# Patient Record
Sex: FEMALE | Race: BLACK OR AFRICAN AMERICAN | Employment: OTHER | URBAN - METROPOLITAN AREA
[De-identification: names, ages, dates, MRNs, and addresses within clinical notes are randomized per-mention and may not be internally consistent; named-entity substitution may affect disease eponyms.]

---

## 2017-01-04 ENCOUNTER — OFFICE VISIT (OUTPATIENT)
Dept: NEUROLOGY | Age: 82
End: 2017-01-04

## 2017-01-04 VITALS
HEIGHT: 65 IN | WEIGHT: 162.4 LBS | OXYGEN SATURATION: 98 % | HEART RATE: 120 BPM | RESPIRATION RATE: 20 BRPM | SYSTOLIC BLOOD PRESSURE: 132 MMHG | DIASTOLIC BLOOD PRESSURE: 68 MMHG | BODY MASS INDEX: 27.06 KG/M2

## 2017-01-04 DIAGNOSIS — G20 PARKINSON'S DISEASE (HCC): Primary | ICD-10-CM

## 2017-01-04 DIAGNOSIS — G40.209 PARTIAL EPILEPSY WITH IMPAIRMENT OF CONSCIOUSNESS (HCC): ICD-10-CM

## 2017-01-04 RX ORDER — CARBIDOPA AND LEVODOPA 25; 100 MG/1; MG/1
1.5 TABLET ORAL 3 TIMES DAILY
Qty: 145 TAB | Refills: 6 | Status: SHIPPED | OUTPATIENT
Start: 2017-01-04 | End: 2017-04-05 | Stop reason: SDUPTHER

## 2017-01-04 NOTE — PROGRESS NOTES
Date:  2017    Name:  Maritza Fierro  :  1935  MRN:  [id number]     PCP:  Chandrika Stephens MD    Chief Complaint   Patient presents with    Parkinsons Disease     follow up    Epilepsy     HISTORY OF PRESENT ILLNESS: Follow up for PD and epilepsy. Continues to be stable with regard to epilepsy on present therapy of Dilantin ER. No seizures since . With regard to the PD: Tremors/Shaking-only with certain positions. Occurs at rest. Questions diagnosis as balance issues are attributed to her arthritis and tremors are not that noticeable. Muscle stiffness (rigidity), Problems with balance, shuffling walk, falls but attributes this with her severe knee arthritis. Slowness (bradykinesia)-states that she is very slow and takes her a longer time to get going. Notes that the arthritis in her knees and back makes everything worse. No freezing or wearing off, off time, Soft voice, issues swallowing, Sudden, erratic movements (Dyskinesias),Memory loss, difficulty thinking or remembering clearly, Depression/anxiety  Difficulty sleeping but this relates to discomfort in her legs and feet which is related to the numbness and tingling, no issues with talking in sleep or acting out dreams, Hallucinations/delusions, lightheadedness, positional dizziness. No issues with cooking, driving a car, eating, Getting dressed, bathing, housework/yardwork, rising from the bed or chair, shopping, handwriting, recreational activities     Current Outpatient Prescriptions   Medication Sig    ERGOCALCIFEROL, VITAMIN D2, (VITAMIN D2 PO) Take  by mouth.  carbidopa-levodopa (SINEMET)  mg per tablet Take 1 Tab by mouth three (3) times daily. 1/2 tab po tid for 1 week, then 1 pot tid thereafter. ..    DILANTIN 100 mg ER capsule TAKE 4 CAPSULES BY MOUTH ON EVEN DAYS AND 3 CAPSULES BY MOUTH ON ODD DAYS    traMADol-acetaminophen (ULTRACET) 37.5-325 mg per tablet Take one by mouth as needed for pain    ferrous sulfate, dried (IRON) 160 mg (50 mg iron) TbER Take  by mouth.  irbesartan (AVAPRO) 300 mg tablet Take 300 mg by mouth nightly.  nystatin-triamcinolone (MYCOLOG) 100,000-0.1 unit/gram-% ointment Apply  to affected area two (2) times a day.  multivitamin with iron tablet Take 1 Tab by mouth daily.  metFORMIN (GLUCOPHAGE) 500 mg tablet Take  by mouth two (2) times daily (with meals).  simvastatin (ZOCOR) 10 mg tablet Take  by mouth nightly. No current facility-administered medications for this visit. No Known Allergies  Past Medical History   Diagnosis Date    Anemia     Arthritis     DDD (degenerative disc disease)     Diabetes [facility]     Diabetes mellitus with neuropathy [facility]     Endogenous hyperlipemia     Hypertension     Parkinson disease ([facility]     Seizures ([facility]      Past Surgical History   Procedure Laterality Date    Hx oophorectomy       Social History     Social History    Marital status:      Spouse name: N/A    Number of children: N/A    Years of education: N/A     Occupational History    Not on file. Social History Main Topics    Smoking status: Former Smoker     Quit date: 10/23/1995    Smokeless tobacco: Current User     Types: Chew    Alcohol use Yes    Drug use: No    Sexual activity: Not on file     Other Topics Concern    Not on file     Social History Narrative     History reviewed. No pertinent family history. PHYSICAL EXAMINATION:    Visit Vitals    /68    Pulse (!) 120    Resp 20    Ht 5' 5\" (1.651 m)    Wt 73.7 kg (162 lb 6.4 oz)    SpO2 98%    BMI 27.02 kg/m2     General: Well defined, nourished, and groomed individual in no acute distress. Neck: Supple, nontender, no bruits, no pain with resistance to active range of motion. Heart: Regular rate and rhythm, no murmurs, rub, or gallop. Normal S1S2.   Lungs: Clear to auscultation bilaterally with equal chest expansion, no cough, no wheeze  Musculoskeletal: Extremities revealed no edema and had full range of motion of joints. Psych: Good mood and bright affect     NEUROLOGICAL EXAMINATION:   Mental Status: Alert and oriented to person, place, and time with recent and remote memory intact. Attention span and concentration are normal. Speech is fluent with a full fund of knowledge.      Cranial Nerves:   II, III, IV, VI: Visual acuity grossly intact. Visual fields are normal.   Pupils are equal, round, and reactive to light and accommodation. Extra-ocular movements are full and fluid. Fundoscopic exam was benign, no ptosis or nystagmus. V-XII: Hearing is grossly intact. Facial features are symmetric, with normal sensation and strength. The palate rises symmetrically and the tongue protrudes midline. Sternocleidomastoids 5/5.      Motor Examination: Normal tone, bulk, and strength, 5/5 muscle strength throughout. Mild bilateral cogwheel rigidity, left greater than right.      Coordination: Finger to nose and rapid arm movement testing revealed bradykinesia. Left resting or intention tremor     Gait and Station: Scissor gait, shuffled, decreased bilateral arm swing, L>R, left attenuation of the tremor, 7 step en bloc turn. No pronator drift. No muscle wasting or fasiculations noted.      Reflexes: DTRs 2+ throughout. ASSESSMENT AND PLAN    ICD-10-CM ICD-9-CM    1. Parkinson's disease ([address] G20 332.0 carbidopa-levodopa (SINEMET)  mg per tablet   2. Partial epilepsy with impairment of consciousness ([address] G40.209 345.40      Discussed her exam and features of which would make her diagnosis of Parkinson disease most likely. We could send her for a SABAS scan which may help to confirm the diagnosis as well as second opinion. However, she seemed satisfied with the explanation that was provided. We discussed again the signs and symptoms of PD, diagnosis, prognosis, progression and treatment.  She may be a bit more symptomatic on today's exam than she was at her last and we discussed her present treatment. Will increase the Sinemet to 1.5 tablets three times a day. Epilepsy is stable and will continue present dose of Dilantin. Follow up in three months. Rosa Guthrie

## 2017-01-04 NOTE — PATIENT INSTRUCTIONS
[facility]   Statement to Patients  April 1, 2014      In an effort to ensure the large volume of patient prescription refills is processed in the most efficient and expeditious manner, we are asking our patients to assist us by calling your Pharmacy for all prescription refills, this will include also your  Mail Order Pharmacy. The pharmacy will contact our office electronically to continue the refill process. Please do not wait until the last minute to call your pharmacy. We need at least 48 hours (2days) to fill prescriptions. We also encourage you to call your pharmacy before going to  your prescription to make sure it is ready. With regard to controlled substance prescription refill requests (narcotic refills) that need to be picked up at our office, we ask your cooperation by providing us with at least 72 hours (3days) notice that you will need a refill. We will not refill narcotic prescription refill requests after 4:00pm on any weekday, Monday through Thursday, or after 2:00pm on Fridays, or on the weekends. We encourage everyone to explore another way of getting your prescription refill request processed using [employer], our patient web portal through our electronic medical record system. [employer] is an efficient and effective way to communicate your medication request directly to the office and  downloadable as an geovany on your smart phone . [employer] also features a review functionality that allows you to view your medication list as well as leave messages for your physician. Are you ready to get connected? If so please review the attatched instructions or speak to any of our staff to get you set up right away! Thank you so much for your cooperation. Should you have any questions please contact our Practice Administrator.     The Physicians and Staff,  [facility]       Thank you for choosing [facility] for your     care. You may receive a survey about your visit. We appreciate you taking time     to complete this survey as we use your feedback to improve our services. We     realize we are not perfect, but we strive to provide excellent care. A Healthy Lifestyle: Care Instructions  Your Care Instructions  A healthy lifestyle can help you feel good, stay at a healthy weight, and have plenty of energy for both work and play. A healthy lifestyle is something you can share with your whole family. A healthy lifestyle also can lower your risk for serious health problems, such as high blood pressure, heart disease, and diabetes. You can follow a few steps listed below to improve your health and the health of your family. Follow-up care is a key part of your treatment and safety. Be sure to make and go to all appointments, and call your doctor if you are having problems. Its also a good idea to know your test results and keep a list of the medicines you take. How can you care for yourself at home? · Do not eat too much sugar, fat, or fast foods. You can still have dessert and treats now and then. The goal is moderation. · Start small to improve your eating habits. Pay attention to portion sizes, drink less juice and soda pop, and eat more fruits and vegetables. ¨ Eat a healthy amount of food. A 3-ounce serving of meat, for example, is about the size of a deck of cards. Fill the rest of your plate with vegetables and whole grains. ¨ Limit the amount of soda and sports drinks you have every day. Drink more water when you are thirsty. ¨ Eat at least 5 servings of fruits and vegetables every day. It may seem like a lot, but it is not hard to reach this goal. A serving or helping is 1 piece of fruit, 1 cup of vegetables, or 2 cups of leafy, raw vegetables. Have an apple or some carrot sticks as an afternoon snack instead of a candy bar.  Try to have fruits and/or vegetables at every meal.  · Make exercise part of your daily routine. You may want to start with simple activities, such as walking, bicycling, or slow swimming. Try to be active 30 to 60 minutes every day. You do not need to do all 30 to 60 minutes all at once. For example, you can exercise 3 times a day for 10 or 20 minutes. Moderate exercise is safe for most people, but it is always a good idea to talk to your doctor before starting an exercise program.  · Keep moving. Fidel Travon the lawn, work in the garden, or [employer]. Take the stairs instead of the elevator at work. · If you smoke, quit. People who smoke have an increased risk for heart attack, stroke, cancer, and other lung illnesses. Quitting is hard, but there are ways to boost your chance of quitting tobacco for good. ¨ Use nicotine gum, patches, or lozenges. ¨ Ask your doctor about stop-smoking programs and medicines. ¨ Keep trying. In addition to reducing your risk of diseases in the future, you will notice some benefits soon after you stop using tobacco. If you have shortness of breath or asthma symptoms, they will likely get better within a few weeks after you quit. · Limit how much alcohol you drink. Moderate amounts of alcohol (up to 2 drinks a day for men, 1 drink a day for women) are okay. But drinking too much can lead to liver problems, high blood pressure, and other health problems. Family health  If you have a family, there are many things you can do together to improve your health. · Eat meals together as a family as often as possible. · Eat healthy foods. This includes fruits, vegetables, lean meats and dairy, and whole grains. · Include your family in your fitness plan. Most people think of activities such as jogging or tennis as the way to fitness, but there are many ways you and your family can be more active. Anything that makes you breathe hard and gets your heart pumping is exercise.  Here are some tips:  ¨ Walk to do errands or to take your child to school or the bus. ¨ Go for a family bike ride after dinner instead of watching TV. Where can you learn more? Go to http://elaine-sandra.info/. Enter L621 in the search box to learn more about \"A Healthy Lifestyle: Care Instructions. \"  Current as of: July 26, 2016  Content Version: 11.1  © 6776-3593 [employer]. Care instructions adapted under license by [employer] (which disclaims liability or warranty for this information). If you have questions about a medical condition or this instruction, always ask your healthcare professional. [place] [address] any warranty or liability for your use of this information.

## 2017-01-04 NOTE — MR AVS SNAPSHOT
Visit Information Date & Time Provider Department Dept. Phone Encounter #  
 1/4/2017  1:00 PM Rebeka Castro NP Neurology [facility] [phone] [id number] Follow-up Instructions Return in about 3 months (around 4/4/2017). Upcoming Health Maintenance Date Due  
 FOOT EXAM Q1 11/8/1945 MICROALBUMIN Q1 11/8/1945 EYE EXAM RETINAL OR DILATED Q1 11/8/1946 DTaP/Tdap/Td series (1 - Tdap) 11/8/1957 ZOSTER VACCINE AGE 60> 11/8/1996 GLAUCOMA SCREENING Q2Y 11/8/2001 OSTEOPOROSIS SCREENING (DEXA) 11/8/2001 Pneumococcal 65+ Low/Medium Risk (1 of 2 - PCV13) 11/8/2001 MEDICARE YEARLY EXAM 11/8/2001 INFLUENZA AGE 9 TO ADULT 8/1/2016 HEMOGLOBIN A1C Q6M 1/11/2017 LIPID PANEL Q1 4/7/2017 Allergies as of 1/4/2017  Review Complete On: 1/4/2017 By: Rebeka Castro NP No Known Allergies Current Immunizations  Never Reviewed No immunizations on file. Not reviewed this visit You Were Diagnosed With   
  
 Codes Comments Parkinson's disease ([facility]    -  Primary ICD-10-CM: G20 
ICD-9-CM: 332.0 Partial epilepsy with impairment of consciousness ([facility]     ICD-10-CM: X91.226 ICD-9-CM: 345.40 Vitals BP Pulse Resp Height(growth percentile) Weight(growth percentile) SpO2  
 132/68 (!) 120 20 5' 5\" (1.651 m) 162 lb 6.4 oz (73.7 kg) 98% BMI OB Status Smoking Status 27.02 kg/m2 Hysterectomy Former Smoker BMI and BSA Data Body Mass Index Body Surface Area  
 27.02 kg/m 2 1.84 m 2 Preferred Pharmacy Pharmacy Name Phone [facility] [phone], [address] [facility] [phone] Your Updated Medication List  
  
   
This list is accurate as of: 1/4/17  1:40 PM.  Always use your most recent med list.  
  
  
  
  
 AVAPRO 300 mg tablet Generic drug:  irbesartan Take 300 mg by mouth nightly. carbidopa-levodopa  mg per tablet Commonly known as:  SINEMET Take 1.5 Tabs by mouth three (3) times daily. Dilantin 100 mg ER capsule Generic drug:  phenytoin ER  
TAKE 4 CAPSULES BY MOUTH ON EVEN DAYS AND 3 CAPSULES BY MOUTH ON ODD DAYS Iron 160 mg (50 mg iron) Tber tablet Generic drug:  ferrous sulfate ER Take  by mouth.  
  
 metFORMIN 500 mg tablet Commonly known as:  GLUCOPHAGE Take  by mouth two (2) times daily (with meals). multivitamin with iron tablet Take 1 Tab by mouth daily. nystatin-triamcinolone 100,000-0.1 unit/gram-% ointment Commonly known as:  [place] Apply  to affected area two (2) times a day. traMADol-acetaminophen 37.5-325 mg per tablet Commonly known as:  ULTRACET Take one by mouth as needed for pain VITAMIN D2 PO Take  by mouth. ZOCOR 10 mg tablet Generic drug:  simvastatin Take  by mouth nightly. Prescriptions Sent to Pharmacy Refills  
 carbidopa-levodopa (SINEMET)  mg per tablet 6 Sig: Take 1.5 Tabs by mouth three (3) times daily. Class: Normal  
 Pharmacy: [email or web address] [address] #: [phone] Route: Oral  
  
Follow-up Instructions Return in about 3 months (around 4/4/2017). Patient Instructions PRESCRIPTION REFILL POLICY [facility] Statement to Patients April 1, 2014 In an effort to ensure the large volume of patient prescription refills is processed in the most efficient and expeditious manner, we are asking our patients to assist us by calling your Pharmacy for all prescription refills, this will include also your  Mail Order Pharmacy. The pharmacy will contact our office electronically to continue the refill process. Please do not wait until the last minute to call your pharmacy. We need at least 48 hours (2days) to fill prescriptions.  We also encourage you to call your pharmacy before going to  your prescription to make sure it is ready. With regard to controlled substance prescription refill requests (narcotic refills) that need to be picked up at our office, we ask your cooperation by providing us with at least 72 hours (3days) notice that you will need a refill. We will not refill narcotic prescription refill requests after 4:00pm on any weekday, Monday through Thursday, or after 2:00pm on Fridays, or on the weekends. We encourage everyone to explore another way of getting your prescription refill request processed using [employer], our patient web portal through our electronic medical record system. [employer] is an efficient and effective way to communicate your medication request directly to the office and  downloadable as an geovany on your smart phone . [employer] also features a review functionality that allows you to view your medication list as well as leave messages for your physician. Are you ready to get connected? If so please review the attatched instructions or speak to any of our staff to get you set up right away! Thank you so much for your cooperation. Should you have any questions please contact our Practice Administrator. The Physicians and Staff,  [facility] Thank you for choosing [facility] and [facility] for your  
 
care. You may receive a survey about your visit. We appreciate you taking time  
 
to complete this survey as we use your feedback to improve our services. We  
 
realize we are not perfect, but we strive to provide excellent care. A Healthy Lifestyle: Care Instructions Your Care Instructions A healthy lifestyle can help you feel good, stay at a healthy weight, and have plenty of energy for both work and play. A healthy lifestyle is something you can share with your whole family.  
A healthy lifestyle also can lower your risk for serious health problems, such as high blood pressure, heart disease, and diabetes. You can follow a few steps listed below to improve your health and the health of your family. Follow-up care is a key part of your treatment and safety. Be sure to make and go to all appointments, and call your doctor if you are having problems. Its also a good idea to know your test results and keep a list of the medicines you take. How can you care for yourself at home? · Do not eat too much sugar, fat, or fast foods. You can still have dessert and treats now and then. The goal is moderation. · Start small to improve your eating habits. Pay attention to portion sizes, drink less juice and soda pop, and eat more fruits and vegetables. ¨ Eat a healthy amount of food. A 3-ounce serving of meat, for example, is about the size of a deck of cards. Fill the rest of your plate with vegetables and whole grains. ¨ Limit the amount of soda and sports drinks you have every day. Drink more water when you are thirsty. ¨ Eat at least 5 servings of fruits and vegetables every day. It may seem like a lot, but it is not hard to reach this goal. A serving or helping is 1 piece of fruit, 1 cup of vegetables, or 2 cups of leafy, raw vegetables. Have an apple or some carrot sticks as an afternoon snack instead of a candy bar. Try to have fruits and/or vegetables at every meal. 
· Make exercise part of your daily routine. You may want to start with simple activities, such as walking, bicycling, or slow swimming. Try to be active 30 to 60 minutes every day. You do not need to do all 30 to 60 minutes all at once. For example, you can exercise 3 times a day for 10 or 20 minutes. Moderate exercise is safe for most people, but it is always a good idea to talk to your doctor before starting an exercise program. 
· Keep moving. Ha[place] Cluck the lawn, work in the garden, or [employer] [place]. Take the stairs instead of the elevator at work. · If you smoke, quit. People who smoke have an increased risk for heart attack, stroke, cancer, and other lung illnesses. Quitting is hard, but there are ways to boost your chance of quitting tobacco for good. ¨ Use nicotine gum, patches, or lozenges. ¨ Ask your doctor about stop-smoking programs and medicines. ¨ Keep trying. In addition to reducing your risk of diseases in the future, you will notice some benefits soon after you stop using tobacco. If you have shortness of breath or asthma symptoms, they will likely get better within a few weeks after you quit. · Limit how much alcohol you drink. Moderate amounts of alcohol (up to 2 drinks a day for men, 1 drink a day for women) are okay. But drinking too much can lead to liver problems, high blood pressure, and other health problems. Family health If you have a family, there are many things you can do together to improve your health. · Eat meals together as a family as often as possible. · Eat healthy foods. This includes fruits, vegetables, lean meats and dairy, and whole grains. · Include your family in your fitness plan. Most people think of activities such as jogging or tennis as the way to fitness, but there are many ways you and your family can be more active. Anything that makes you breathe hard and gets your heart pumping is exercise. Here are some tips: 
¨ Walk to do errands or to take your child to school or the bus. ¨ Go for a family bike ride after dinner instead of watching TV. Where can you learn more? Go to http://elaine-sandra.info/. Enter W241 in the search box to learn more about \"A Healthy Lifestyle: Care Instructions. \" Current as of: July 26, 2016 Content Version: 11.1 © 0791-0486 [employer]. Care instructions adapted under license by [employer] (which disclaims liability or warranty for this information).  If you have questions about a medical condition or this instruction, always ask your healthcare professional. [facility] any warranty or liability for your use of this information. Introducing [facility] SERVICES! Vicki Segura introduces "[employer] patient portal. Now you can access parts of your medical record, email your doctor's office, and request medication refills online. 1. In your internet browser, go to [email or web address] 2. Click on the First Time User? Click Here link in the Sign In box. You will see the New Member Sign Up page. 3. Enter your "[employer] Access Code exactly as it appears below. You will not need to use this code after youve completed the sign-up process. If you do not sign up before the expiration date, you must request a new code. · "[employer] Access Code: [email or web address] Expires: 4/4/2017  1:40 PM 
 
4. Enter the last four digits of your Social Security Number (xxxx) and Date of Birth (mm/dd/yyyy) as indicated and click Submit. You will be taken to the next sign-up page. 5. Create a "[employer] ID. This will be your "[employer] login ID and cannot be changed, so think of one that is secure and easy to remember. 6. Create a "[employer] password. You can change your password at any time. 7. Enter your Password Reset Question and Answer. This can be used at a later time if you forget your password. 8. Enter your e-mail address. You will receive e-mail notification when new information is available in [address] 9. Click Sign Up. You can now view and download portions of your medical record. 10. Click the Download Summary menu link to download a portable copy of your medical information. If you have questions, please visit the Frequently Asked Questions section of the "[employer] website. Remember, "[employer] is NOT to be used for urgent needs. For medical emergencies, dial 911. Now available from your iPhone and Android! Please provide this summary of care documentation to your next provider. Your primary care clinician is listed as Ronni Miller. If you have any questions after today's visit, please call [phone].

## 2017-01-04 NOTE — PROGRESS NOTES
Patient present for a follow up. Reports no recurrent seizure activity since 1995. Tremors only occur when she's nervous or when her hands are positioned a certain way on her legs.

## 2017-01-05 ENCOUNTER — TELEPHONE (OUTPATIENT)
Dept: NEUROLOGY | Age: 82
End: 2017-01-05

## 2017-04-05 ENCOUNTER — OFFICE VISIT (OUTPATIENT)
Dept: NEUROLOGY | Age: 82
End: 2017-04-05

## 2017-04-05 VITALS
HEIGHT: 65 IN | SYSTOLIC BLOOD PRESSURE: 132 MMHG | BODY MASS INDEX: 26.99 KG/M2 | DIASTOLIC BLOOD PRESSURE: 62 MMHG | RESPIRATION RATE: 20 BRPM | WEIGHT: 162 LBS

## 2017-04-05 DIAGNOSIS — M54.31 SCIATICA OF RIGHT SIDE: ICD-10-CM

## 2017-04-05 DIAGNOSIS — M17.0 PRIMARY OSTEOARTHRITIS OF BOTH KNEES: ICD-10-CM

## 2017-04-05 DIAGNOSIS — G40.209 PARTIAL EPILEPSY WITH IMPAIRMENT OF CONSCIOUSNESS (HCC): ICD-10-CM

## 2017-04-05 DIAGNOSIS — G60.9 IDIOPATHIC PERIPHERAL NEUROPATHY: ICD-10-CM

## 2017-04-05 DIAGNOSIS — G20 PARKINSON'S DISEASE (HCC): Primary | ICD-10-CM

## 2017-04-05 RX ORDER — LIDOCAINE 50 MG/G
PATCH TOPICAL
COMMUNITY
Start: 2017-04-04

## 2017-04-05 RX ORDER — GABAPENTIN 100 MG/1
100 CAPSULE ORAL 3 TIMES DAILY
Qty: 90 CAP | Refills: 2 | Status: SHIPPED | OUTPATIENT
Start: 2017-04-05 | End: 2017-07-27 | Stop reason: SDUPTHER

## 2017-04-05 RX ORDER — PHENYTOIN SODIUM EXTENDED 100 MG
CAPSULE ORAL
Qty: 360 CAP | Refills: 1 | Status: SHIPPED | OUTPATIENT
Start: 2017-04-05 | End: 2018-05-15 | Stop reason: SDUPTHER

## 2017-04-05 RX ORDER — CARBIDOPA AND LEVODOPA 25; 100 MG/1; MG/1
1.5 TABLET ORAL 3 TIMES DAILY
Qty: 145 TAB | Refills: 6 | Status: SHIPPED | OUTPATIENT
Start: 2017-04-05 | End: 2018-03-12 | Stop reason: SDUPTHER

## 2017-04-05 NOTE — PATIENT INSTRUCTIONS
[facility]   Statement to Patients  April 1, 2014      In an effort to ensure the large volume of patient prescription refills is processed in the most efficient and expeditious manner, we are asking our patients to assist us by calling your Pharmacy for all prescription refills, this will include also your  Mail Order Pharmacy. The pharmacy will contact our office electronically to continue the refill process. Please do not wait until the last minute to call your pharmacy. We need at least 48 hours (2days) to fill prescriptions. We also encourage you to call your pharmacy before going to  your prescription to make sure it is ready. With regard to controlled substance prescription refill requests (narcotic refills) that need to be picked up at our office, we ask your cooperation by providing us with at least 72 hours (3days) notice that you will need a refill. We will not refill narcotic prescription refill requests after 4:00pm on any weekday, Monday through Thursday, or after 2:00pm on Fridays, or on the weekends. We encourage everyone to explore another way of getting your prescription refill request processed using [employer], our patient web portal through our electronic medical record system. [employer] is an efficient and effective way to communicate your medication request directly to the office and  downloadable as an geovany on your smart phone . [employer] also features a review functionality that allows you to view your medication list as well as leave messages for your physician. Are you ready to get connected? If so please review the attatched instructions or speak to any of our staff to get you set up right away! Thank you so much for your cooperation. Should you have any questions please contact our Practice Administrator. The Physicians and Staff,  [facility] [address]  What is a living will?   A living will is a legal form you use to write down the kind of care you want at the end of your life. It is used by the health professionals who will treat you if you aren't able to decide for yourself. If you put your wishes in writing, your loved ones and others will know what kind of care you want. They won't need to guess. This can ease your mind and be helpful to others. A living will is not the same as an estate or property will. An estate will explains what you want to happen with your money and property after you die. Is a living will a legal document? A living will is a legal document. Each state has its own laws about living caceres. If you move to another state, make sure that your living will is legal in the state where you now live. Or you might use a universal form that has been approved by many states. This kind of form can sometimes be completed and stored online. Your electronic copy will then be available wherever you have a connection to the Internet. In most cases, doctors will respect your wishes even if you have a form from a different state. · You don't need an  to complete a living will. But legal advice can be helpful if your state's laws are unclear, your health history is complicated, or your family can't agree on what should be in your living will. · You can change your living will at any time. Some people find that their wishes about end-of-life care change as their health changes. · In addition to making a living will, think about completing a medical power of  form. This form lets you name the person you want to make end-of-life treatment decisions for you (your \"health care agent\") if you're not able to. Many hospitals and nursing homes will give you the forms you need to complete a living will and a medical power of . · Your living will is used only if you can't make or communicate decisions for yourself anymore.  If you become able to make decisions again, you can accept or refuse any treatment, no matter what you wrote in your living will. · Your state may offer an online registry. This is a place where you can store your living will online so the doctors and nurses who need to treat you can find it right away. What should you think about when creating a living will? Talk about your end-of-life wishes with your family members and your doctor. Let them know what you want. That way the people making decisions for you won't be surprised by your choices. Think about these questions as you make your living will:  · Do you know enough about life support methods that might be used? If not, talk to your doctor so you know what might be done if you can't breathe on your own, your heart stops, or you're unable to swallow. · What things would you still want to be able to do after you receive life-support methods? Would you want to be able to walk? To speak? To eat on your own? To live without the help of machines? · If you have a choice, where do you want to be cared for? In your home? At a hospital or nursing home? · Do you want certain Orthodox practices performed if you become very ill? · If you have a choice at the end of your life, where would you prefer to die? At home? In a hospital or nursing home? Somewhere else? · Would you prefer to be buried or cremated? · Do you want your organs to be donated after you die? What should you do with your living will? · Make sure that your family members and your health care agent have copies of your living will. · Give your doctor a copy of your living will to keep in your medical record. If you have more than one doctor, make sure that each one has a copy. · You may want to put a copy of your living will where it can be easily found. Where can you learn more? Go to http://elaine-sandra.info/. Enter F551 in the search box to learn more about \"Learning About Living [place]. \"  Current as of: February 24, 2016  Content Version: 11.2  © 2428-3744 [employer]. Care instructions adapted under license by [employer] (which disclaims liability or warranty for this information). If you have questions about a medical condition or this instruction, always ask your healthcare professional. [facility] any warranty or liability for your use of this information. Advance Directives: Care Instructions  Your Care Instructions  An advance directive is a legal way to state your wishes at the end of your life. It tells your family and your doctor what to do if you can no longer say what you want. There are two main types of advance directives. You can change them any time that your wishes change. · A living will tells your family and your doctor your wishes about life support and other treatment. · A durable power of  for health care lets you name a person to make treatment decisions for you when you can't speak for yourself. This person is called a health care agent. If you do not have an advance directive, decisions about your medical care may be made by a doctor or a  who doesn't know you. It may help to think of an advance directive as a gift to the people who care for you. If you have one, they won't have to make tough decisions by themselves. Follow-up care is a key part of your treatment and safety. Be sure to make and go to all appointments, and call your doctor if you are having problems. It's also a good idea to know your test results and keep a list of the medicines you take. How can you care for yourself at home? · Discuss your wishes with your loved ones and your doctor. This way, there are no surprises. · Many states have a unique form. Or you might use a universal form that has been approved by many states. This kind of form can sometimes be completed and stored online.  Your electronic copy will then be available wherever you have a connection to the Internet. In most cases, doctors will respect your wishes even if you have a form from a different state. · You don't need a  to do an advance directive. But you may want to get legal advice. · Think about these questions when you prepare an advance directive:  ¨ Who do you want to make decisions about your medical care if you are not able to? Many people choose a family member or close friend. ¨ Do you know enough about life support methods that might be used? If not, talk to your doctor so you understand. ¨ What are you most afraid of that might happen? You might be afraid of having pain, losing your independence, or being kept alive by machines. ¨ Where would you prefer to die? Choices include your home, a hospital, or a nursing home. ¨ Would you like to have information about hospice care to support you and your family? ¨ Do you want to donate organs when you die? ¨ Do you want certain Adventism practices performed before you die? If so, put your wishes in the advance directive. · Read your advance directive every year, and make changes as needed. When should you call for help? Be sure to contact your doctor if you have any questions. Where can you learn more? Go to http://elaine-sandra.info/. Enter R264 in the search box to learn more about \"Advance Directives: Care Instructions. \"  Current as of: November 17, 2016  Content Version: 11.2  © 7452-9581 [employer]. Care instructions adapted under license by [employer] (which disclaims liability or warranty for this information). If you have questions about a medical condition or this instruction, always ask your healthcare professional. [address] any warranty or liability for your use of this information.

## 2017-04-05 NOTE — MR AVS SNAPSHOT
Visit Information Date & Time Provider Department Dept. Phone Encounter #  
 4/5/2017  1:00 PM Jaret Crane NP Neurology [facility] [phone] [id number] Follow-up Instructions Return in about 3 months (around 7/5/2017). Upcoming Health Maintenance Date Due  
 FOOT EXAM Q1 11/8/1945 MICROALBUMIN Q1 11/8/1945 EYE EXAM RETINAL OR DILATED Q1 11/8/1946 DTaP/Tdap/Td series (1 - Tdap) 11/8/1957 ZOSTER VACCINE AGE 60> 11/8/1996 GLAUCOMA SCREENING Q2Y 11/8/2001 OSTEOPOROSIS SCREENING (DEXA) 11/8/2001 Pneumococcal 65+ Low/Medium Risk (1 of 2 - PCV13) 11/8/2001 MEDICARE YEARLY EXAM 11/8/2001 INFLUENZA AGE 9 TO ADULT 8/1/2016 HEMOGLOBIN A1C Q6M 1/11/2017 LIPID PANEL Q1 4/7/2017 Allergies as of 4/5/2017  Review Complete On: 4/5/2017 By: Jaret Crane NP No Known Allergies Current Immunizations  Never Reviewed No immunizations on file. Not reviewed this visit You Were Diagnosed With   
  
 Codes Comments Parkinson's disease ([facility]    -  Primary ICD-10-CM: G20 
ICD-9-CM: 332.0 Partial epilepsy with impairment of consciousness ([facility]     ICD-10-CM: J89.560 ICD-9-CM: 345.40 Primary osteoarthritis of both knees     ICD-10-CM: M17.0 ICD-9-CM: 715.16 Sciatica of right side     ICD-10-CM: M54.31 
ICD-9-CM: 724.3 Idiopathic peripheral neuropathy     ICD-10-CM: G60.9 ICD-9-CM: 356.9 Vitals BP Resp Height(growth percentile) Weight(growth percentile) BMI OB Status 132/62 20 5' 5\" (1.651 m) 162 lb (73.5 kg) 26.96 kg/m2 Hysterectomy Smoking Status Former Smoker Vitals History BMI and BSA Data Body Mass Index Body Surface Area  
 26.96 kg/m 2 1.84 m 2 Preferred Pharmacy Pharmacy Name Phone [facility] [address], [address] [place] [address] [phone] Your Updated Medication List  
  
   
 This list is accurate as of: 4/5/17  1:49 PM.  Always use your most recent med list.  
  
  
  
  
 AVAPRO 300 mg tablet Generic drug:  irbesartan Take 300 mg by mouth nightly. carbidopa-levodopa  mg per tablet Commonly known as:  SINEMET Take 1.5 Tabs by mouth three (3) times daily. Dilantin 100 mg ER capsule Generic drug:  phenytoin ER  
TAKE 4 CAPSULES BY MOUTH ON EVEN DAYS AND 3 CAPSULES BY MOUTH ON ODD DAYS  
  
 ENSURE PO Take  by mouth.  
  
 gabapentin 100 mg capsule Commonly known as:  NEURONTIN Take 1 Cap by mouth three (3) times daily. Iron 160 mg (50 mg iron) Tber tablet Generic drug:  ferrous sulfate ER Take  by mouth.  
  
 lidocaine 5 % Commonly known as:  LIDODERM  
  
 metFORMIN 500 mg tablet Commonly known as:  GLUCOPHAGE Take  by mouth two (2) times daily (with meals). multivitamin with iron tablet Take 1 Tab by mouth daily. nystatin-triamcinolone 100,000-0.1 unit/gram-% ointment Commonly known as:  Lyle Zuniga Apply  to affected area two (2) times a day. traMADol-acetaminophen 37.5-325 mg per tablet Commonly known as:  ULTRACET Take one by mouth as needed for pain VITAMIN D2 PO Take  by mouth. ZOCOR 10 mg tablet Generic drug:  simvastatin Take  by mouth nightly. Prescriptions Sent to Pharmacy Refills  
 carbidopa-levodopa (SINEMET)  mg per tablet 6 Sig: Take 1.5 Tabs by mouth three (3) times daily. Class: Normal  
 Pharmacy: [facility] [address] [place] [address] Ph #: [phone] Route: Oral  
 DILANTIN 100 mg ER capsule 1 Sig: TAKE 4 CAPSULES BY MOUTH ON EVEN DAYS AND 3 CAPSULES BY MOUTH ON ODD DAYS Class: Normal  
 Pharmacy: [facility] [address] [place] [address] [address] AT [address] [place] [address] Ph #: [phone]  
 gabapentin (NEURONTIN) 100 mg capsule 2 Sig: Take 1 Cap by mouth three (3) times daily. Class: Normal  
 Pharmacy: [facility] [address], [address] [place] [address] #: [phone] Route: Oral  
  
Follow-up Instructions Return in about 3 months (around 7/5/2017). Patient Instructions PRESCRIPTION REFILL POLICY [facility] Statement to Patients April 1, 2014 In an effort to ensure the large volume of patient prescription refills is processed in the most efficient and expeditious manner, we are asking our patients to assist us by calling your Pharmacy for all prescription refills, this will include also your  Mail Order Pharmacy. The pharmacy will contact our office electronically to continue the refill process. Please do not wait until the last minute to call your pharmacy. We need at least 48 hours (2days) to fill prescriptions. We also encourage you to call your pharmacy before going to  your prescription to make sure it is ready. With regard to controlled substance prescription refill requests (narcotic refills) that need to be picked up at our office, we ask your cooperation by providing us with at least 72 hours (3days) notice that you will need a refill. We will not refill narcotic prescription refill requests after 4:00pm on any weekday, Monday through Thursday, or after 2:00pm on Fridays, or on the weekends. We encourage everyone to explore another way of getting your prescription refill request processed using [employer], our patient web portal through our electronic medical record system. [employer] is an efficient and effective way to communicate your medication request directly to the office and  downloadable as an geovany on your smart phone . [employer] also features a review functionality that allows you to view your medication list as well as leave messages for your physician. Are you ready to get connected?  If so please review the attatched instructions or speak to any of our staff to get you set up right away! Thank you so much for your cooperation. Should you have any questions please contact our Practice Administrator. The Physicians and Staff,  [facility] Kelleejihan [address] What is a living will? A living will is a legal form you use to write down the kind of care you want at the end of your life. It is used by the health professionals who will treat you if you aren't able to decide for yourself. If you put your wishes in writing, your loved ones and others will know what kind of care you want. They won't need to guess. This can ease your mind and be helpful to others. A living will is not the same as an estate or property will. An estate will explains what you want to happen with your money and property after you die. Is a living will a legal document? A living will is a legal document. Each state has its own laws about living caceres. If you move to another state, make sure that your living will is legal in the state where you now live. Or you might use a universal form that has been approved by many states. This kind of form can sometimes be completed and stored online. Your electronic copy will then be available wherever you have a connection to the Internet. In most cases, doctors will respect your wishes even if you have a form from a different state. · You don't need an  to complete a living will. But legal advice can be helpful if your state's laws are unclear, your health history is complicated, or your family can't agree on what should be in your living will. · You can change your living will at any time. Some people find that their wishes about end-of-life care change as their health changes. · In addition to making a living will, think about completing a medical power of  form.  This form lets you name the person you want to make end-of-life treatment decisions for you (your \"health care agent\") if you're not able to. Many hospitals and nursing homes will give you the forms you need to complete a living will and a medical power of . · Your living will is used only if you can't make or communicate decisions for yourself anymore. If you become able to make decisions again, you can accept or refuse any treatment, no matter what you wrote in your living will. · Your state may offer an online registry. This is a place where you can store your living will online so the doctors and nurses who need to treat you can find it right away. What should you think about when creating a living will? Talk about your end-of-life wishes with your family members and your doctor. Let them know what you want. That way the people making decisions for you won't be surprised by your choices. Think about these questions as you make your living will: · Do you know enough about life support methods that might be used? If not, talk to your doctor so you know what might be done if you can't breathe on your own, your heart stops, or you're unable to swallow. · What things would you still want to be able to do after you receive life-support methods? Would you want to be able to walk? To speak? To eat on your own? To live without the help of machines? · If you have a choice, where do you want to be cared for? In your home? At a hospital or nursing home? · Do you want certain [employer] practices performed if you become very ill? · If you have a choice at the end of your life, where would you prefer to die? At home? In a hospital or nursing home? Somewhere else? · Would you prefer to be buried or cremated? · Do you want your organs to be donated after you die? What should you do with your living will? · Make sure that your family members and your health care agent have copies of your living will.  
· Give your doctor a copy of your living will to keep in your medical record. If you have more than one doctor, make sure that each one has a copy. · You may want to put a copy of your living will where it can be easily found. Where can you learn more? Go to http://elaine-sandra.info/. Enter Q670 in the search box to learn more about \"Learning About Living Jesse Jackson. \" Current as of: February 24, 2016 Content Version: 11.2 © 0470-5935 [employer]. Care instructions adapted under license by [employer] (which disclaims liability or warranty for this information). If you have questions about a medical condition or this instruction, always ask your healthcare professional. [place] [address] any warranty or liability for your use of this information. Advance Directives: Care Instructions Your Care Instructions An advance directive is a legal way to state your wishes at the end of your life. It tells your family and your doctor what to do if you can no longer say what you want. There are two main types of advance directives. You can change them any time that your wishes change. · A living will tells your family and your doctor your wishes about life support and other treatment. · A durable power of  for health care lets you name a person to make treatment decisions for you when you can't speak for yourself. This person is called a health care agent. If you do not have an advance directive, decisions about your medical care may be made by a doctor or a  who doesn't know you. It may help to think of an advance directive as a gift to the people who care for you. If you have one, they won't have to make tough decisions by themselves. Follow-up care is a key part of your treatment and safety. Be sure to make and go to all appointments, and call your doctor if you are having problems. It's also a good idea to know your test results and keep a list of the medicines you take. How can you care for yourself at home? · Discuss your wishes with your loved ones and your doctor. This way, there are no surprises. · Many states have a unique form. Or you might use a universal form that has been approved by many states. This kind of form can sometimes be completed and stored online. Your electronic copy will then be available wherever you have a connection to the Internet. In most cases, doctors will respect your wishes even if you have a form from a different state. · You don't need a  to do an advance directive. But you may want to get legal advice. · Think about these questions when you prepare an advance directive: ¨ Who do you want to make decisions about your medical care if you are not able to? Many people choose a family member or close friend. ¨ Do you know enough about life support methods that might be used? If not, talk to your doctor so you understand. ¨ What are you most afraid of that might happen? You might be afraid of having pain, losing your independence, or being kept alive by machines. ¨ Where would you prefer to die? Choices include your home, a hospital, or a nursing home. ¨ Would you like to have information about hospice care to support you and your family? ¨ Do you want to donate organs when you die? ¨ Do you want certain Ep[employer]op[employer] practices performed before you die? If so, put your wishes in the advance directive. · Read your advance directive every year, and make changes as needed. When should you call for help? Be sure to contact your doctor if you have any questions. Where can you learn more? Go to http://elaine-sandra.info/. Enter R264 in the search box to learn more about \"Advance Directives: Care Instructions. \" Current as of: November 17, 2016 Content Version: 11.2 © 2259-4612 [employer].  Care instructions adapted under license by [employer] (which disclaims liability or warranty for this information). If you have questions about a medical condition or this instruction, always ask your healthcare professional. [address] any warranty or liability for your use of this information. Introducing [facility]! [facility] introduces viaCycle patient portal. Now you can access parts of your medical record, email your doctor's office, and request medication refills online. 1. In your internet browser, go to [email or web address] 2. Click on the First Time User? Click Here link in the Sign In box. You will see the New Member Sign Up page. 3. Enter your viaCycle Access Code exactly as it appears below. You will not need to use this code after youve completed the sign-up process. If you do not sign up before the expiration date, you must request a new code. · viaCycle Access Code: [email or web address] Expires: 7/4/2017  1:49 PM 
 
4. Enter the last four digits of your Social Security Number (xxxx) and Date of Birth (mm/dd/yyyy) as indicated and click Submit. You will be taken to the next sign-up page. 5. Create a viaCycle ID. This will be your viaCycle login ID and cannot be changed, so think of one that is secure and easy to remember. 6. Create a viaCycle password. You can change your password at any time. 7. Enter your Password Reset Question and Answer. This can be used at a later time if you forget your password. 8. Enter your e-mail address. You will receive e-mail notification when new information is available in [address] 9. Click Sign Up. You can now view and download portions of your medical record. 10. Click the Download Summary menu link to download a portable copy of your medical information. If you have questions, please visit the Frequently Asked Questions section of the viaCycle website. Remember, viaCycle is NOT to be used for urgent needs. For medical emergencies, dial 911. Now available from your iPhone and Android! Please provide this summary of care documentation to your next provider. Your primary care clinician is listed as Omaira Villegas. If you have any questions after today's visit, please call [phone].

## 2017-07-24 ENCOUNTER — OFFICE VISIT (OUTPATIENT)
Dept: NEUROLOGY | Age: 82
End: 2017-07-24

## 2017-07-24 VITALS
BODY MASS INDEX: 27.49 KG/M2 | SYSTOLIC BLOOD PRESSURE: 128 MMHG | OXYGEN SATURATION: 97 % | DIASTOLIC BLOOD PRESSURE: 64 MMHG | HEIGHT: 65 IN | RESPIRATION RATE: 20 BRPM | HEART RATE: 101 BPM | WEIGHT: 165 LBS

## 2017-07-24 DIAGNOSIS — R09.89 RIGHT CAROTID BRUIT: Primary | ICD-10-CM

## 2017-07-24 DIAGNOSIS — G20 PARKINSON DISEASE (HCC): ICD-10-CM

## 2017-07-24 DIAGNOSIS — G40.209 PARTIAL EPILEPSY WITH IMPAIRMENT OF CONSCIOUSNESS (HCC): Primary | ICD-10-CM

## 2017-07-24 DIAGNOSIS — R09.89 RIGHT CAROTID BRUIT: ICD-10-CM

## 2017-07-24 NOTE — PATIENT INSTRUCTIONS

## 2017-07-24 NOTE — MR AVS SNAPSHOT
Visit Information Date & Time Provider Department Dept. Phone Encounter #  
 7/24/2017  2:00 PM Brian Weldon NP [facility] Neurology [facility] [phone] [id number] Upcoming Health Maintenance Date Due  
 FOOT EXAM Q1 11/8/1945 MICROALBUMIN Q1 11/8/1945 EYE EXAM RETINAL OR DILATED Q1 11/8/1946 DTaP/Tdap/Td series (1 - Tdap) 11/8/1957 ZOSTER VACCINE AGE 60> 9/8/1996 GLAUCOMA SCREENING Q2Y 11/8/2001 OSTEOPOROSIS SCREENING (DEXA) 11/8/2001 Pneumococcal 65+ Low/Medium Risk (1 of 2 - PCV13) 11/8/2001 MEDICARE YEARLY EXAM 11/8/2001 HEMOGLOBIN A1C Q6M 1/11/2017 LIPID PANEL Q1 4/7/2017 INFLUENZA AGE 9 TO ADULT 8/1/2017 Allergies as of 7/24/2017  Review Complete On: 7/24/2017 By: Brian Weldon NP No Known Allergies Current Immunizations  Never Reviewed No immunizations on file. Not reviewed this visit You Were Diagnosed With   
  
 Codes Comments Partial epilepsy with impairment of consciousness ([facility] [address]    -  Primary ICD-10-CM: T02.655 ICD-9-CM: 345.40 [facility] ([facility]     ICD-10-CM: G20 
ICD-9-CM: 332.0 Right carotid bruit     ICD-10-CM: R09.89 ICD-9-CM: 658. 9 Vitals BP Pulse Resp Height(growth percentile) Weight(growth percentile) SpO2  
 128/64 (!) 101 20 5' 5\" (1.651 m) 165 lb (74.8 kg) 97% BMI OB Status Smoking Status 27.46 kg/m2 Hysterectomy Former Smoker Vitals History BMI and BSA Data Body Mass Index Body Surface Area  
 27.46 kg/m 2 1.85 m 2 Preferred Pharmacy Pharmacy Name Phone [facility] [address], [address] [facility] [phone] Your Updated Medication List  
  
   
This list is accurate as of: 7/24/17  3:04 PM.  Always use your most recent med list.  
  
  
  
  
 AVAPRO 300 mg tablet Generic drug:  irbesartan Take 300 mg by mouth nightly. carbidopa-levodopa  mg per tablet Commonly known as:  SINEMET Take 1.5 Tabs by mouth three (3) times daily. Dilantin 100 mg ER capsule Generic drug:  phenytoin ER  
TAKE 4 CAPSULES BY MOUTH ON EVEN DAYS AND 3 CAPSULES BY MOUTH ON ODD DAYS  
  
 ENSURE PO Take  by mouth.  
  
 gabapentin 100 mg capsule Commonly known as:  NEURONTIN Take 1 Cap by mouth three (3) times daily. Iron 160 mg (50 mg iron) Tber tablet Generic drug:  ferrous sulfate ER Take  by mouth.  
  
 lidocaine 5 % Commonly known as:  LIDODERM  
  
 metFORMIN 500 mg tablet Commonly known as:  GLUCOPHAGE Take  by mouth two (2) times daily (with meals). multivitamin with iron tablet Take 1 Tab by mouth daily. nystatin-triamcinolone 100,000-0.1 unit/gram-% ointment Commonly known as:  Tonny Dings Apply  to affected area two (2) times a day. traMADol-acetaminophen 37.5-325 mg per tablet Commonly known as:  ULTRACET Take one by mouth as needed for pain VITAMIN D2 PO Take  by mouth. ZOCOR 10 mg tablet Generic drug:  simvastatin Take  by mouth nightly. To-Do List   
 07/24/2017 Imaging:  DUPLEX CAROTID BILATERAL AMB NEURO Patient Instructions A Healthy Lifestyle: Care Instructions Your Care Instructions A healthy lifestyle can help you feel good, stay at a healthy weight, and have plenty of energy for both work and play. A healthy lifestyle is something you can share with your whole family. A healthy lifestyle also can lower your risk for serious health problems, such as high blood pressure, heart disease, and diabetes. You can follow a few steps listed below to improve your health and the health of your family. Follow-up care is a key part of your treatment and safety. Be sure to make and go to all appointments, and call your doctor if you are having problems. Its also a good idea to know your test results and keep a list of the medicines you take. How can you care for yourself at home? · Do not eat too much sugar, fat, or fast foods. You can still have dessert and treats now and then. The goal is moderation. · Start small to improve your eating habits. Pay attention to portion sizes, drink less juice and soda pop, and eat more fruits and vegetables. ¨ Eat a healthy amount of food. A 3-ounce serving of meat, for example, is about the size of a deck of cards. Fill the rest of your plate with vegetables and whole grains. ¨ Limit the amount of soda and sports drinks you have every day. Drink more water when you are thirsty. ¨ Eat at least 5 servings of fruits and vegetables every day. It may seem like a lot, but it is not hard to reach this goal. A serving or helping is 1 piece of fruit, 1 cup of vegetables, or 2 cups of leafy, raw vegetables. Have an apple or some carrot sticks as an afternoon snack instead of a candy bar. Try to have fruits and/or vegetables at every meal. 
· Make exercise part of your daily routine. You may want to start with simple activities, such as walking, bicycling, or slow swimming. Try to be active 30 to 60 minutes every day. You do not need to do all 30 to 60 minutes all at once. For example, you can exercise 3 times a day for 10 or 20 minutes. Moderate exercise is safe for most people, but it is always a good idea to talk to your doctor before starting an exercise program. 
· Keep moving. Jaida Bankenneth the lawn, work in the garden, or [employer]. Take the stairs instead of the elevator at work. · If you smoke, quit. People who smoke have an increased risk for heart attack, stroke, cancer, and other lung illnesses. Quitting is hard, but there are ways to boost your chance of quitting tobacco for good. ¨ Use nicotine gum, patches, or lozenges. ¨ Ask your doctor about stop-smoking programs and medicines. ¨ Keep trying.  
In addition to reducing your risk of diseases in the future, you will notice some benefits soon after you stop using tobacco. If you have shortness of breath or asthma symptoms, they will likely get better within a few weeks after you quit. · Limit how much alcohol you drink. Moderate amounts of alcohol (up to 2 drinks a day for men, 1 drink a day for women) are okay. But drinking too much can lead to liver problems, high blood pressure, and other health problems. Family health If you have a family, there are many things you can do together to improve your health. · Eat meals together as a family as often as possible. · Eat healthy foods. This includes fruits, vegetables, lean meats and dairy, and whole grains. · Include your family in your fitness plan. Most people think of activities such as jogging or tennis as the way to fitness, but there are many ways you and your family can be more active. Anything that makes you breathe hard and gets your heart pumping is exercise. Here are some tips: 
¨ Walk to do errands or to take your child to school or the bus. ¨ Go for a family bike ride after dinner instead of watching TV. Where can you learn more? Go to http://[employer] Groupsandra.info/. Enter T135 in the search box to learn more about \"A Healthy Lifestyle: Care Instructions. \" Current as of: July 26, 2016 Content Version: 11.3 © 0459-7695 [employer]. Care instructions adapted under license by [employer] (which disclaims liability or warranty for this information). If you have questions about a medical condition or this instruction, always ask your healthcare professional. [place] [address] any warranty or liability for your use of this information. Introducing [facility]! Ronda Sands introduces [employer] patient portal. Now you can access parts of your medical record, email your doctor's office, and request medication refills online.    
 
1. In your internet browser, go to [email or web address] 2. Click on the First Time User? Click Here link in the Sign In box. You will see the New Member Sign Up page. 3. Enter your [employer]tero Access Code exactly as it appears below. You will not need to use this code after youve completed the sign-up process. If you do not sign up before the expiration date, you must request a new code. · [employer] Access Code: [email or web address] Expires: 10/22/2017  3:03 PM 
 
4. Enter the last four digits of your Social Security Number (xxxx) and Date of Birth (mm/dd/yyyy) as indicated and click Submit. You will be taken to the next sign-up page. 5. Create a General Dynamicst ID. This will be your Nantero login ID and cannot be changed, so think of one that is secure and easy to remember. 6. Create a Nantero password. You can change your password at any time. 7. Enter your Password Reset Question and Answer. This can be used at a later time if you forget your password. 8. Enter your e-mail address. You will receive e-mail notification when new information is available in [address] 9. Click Sign Up. You can now view and download portions of your medical record. 10. Click the Download Summary menu link to download a portable copy of your medical information. If you have questions, please visit the Frequently Asked Questions section of the [employer] website. Remember, [employer]o is NOT to be used for urgent needs. For medical emergencies, dial 911. Now available from your iPhone and Android! Please provide this summary of care documentation to your next provider. Your primary care clinician is listed as Rosa Agrawal. If you have any questions after today's visit, please call [phone].

## 2017-07-24 NOTE — PROGRESS NOTES
Parkinson's disease- she stated most doctors she sees tells her she doesn't have it but they were still encouraging her to take the medication for it  She is about the same as when she was here last   She did get 2 shots in her knees which did help her knees     The office note from today can you please sent to her PCP, Dr. Marietta Urban

## 2017-07-26 NOTE — PROCEDURES
Carotid Doppler:     Date:  07/24/17    Requesting Physician:  Tom Sloan     Indication:  Right carotid bruit. B-mode imaging reveals mixed plaque at the bifurcations bilaterally extending into the bilateral internal carotid artery segments. Doppler spectral analysis reveals no significantly elevated velocities. Vertebral artery flow antegrade bilaterally. Interpretation:  Plaque as noted. Stenosis estimated at 16-49% bilateral ICA.

## 2017-07-27 DIAGNOSIS — G60.9 IDIOPATHIC PERIPHERAL NEUROPATHY: ICD-10-CM

## 2017-07-27 DIAGNOSIS — M54.31 SCIATICA OF RIGHT SIDE: ICD-10-CM

## 2017-07-28 RX ORDER — GABAPENTIN 100 MG/1
CAPSULE ORAL
Qty: 90 CAP | Refills: 0 | Status: SHIPPED | OUTPATIENT
Start: 2017-07-28 | End: 2017-08-31 | Stop reason: SDUPTHER

## 2017-07-29 NOTE — PROGRESS NOTES
Date:  17     Name:  Jose A May  :  1935  MRN:  [id number]     PCP:  Mendez Mcclain MD    Chief Complaint   Patient presents with    Parkinsons Disease     HISTORY OF PRESENT ILLNESS: Follow-up for Parkinson's disease and epilepsy. She has not had any seizure episodes. She continues to be slow and stiff. She was told that she may not have Parkinson's disease per some of her other providers but she was told to continue taking her Parkinson's medication. Except as noted above, denies  fever, chills, cough. No CP or SOB. No dysuria, loss of bowel or bladder control. No Weight loss. Appetite good. Sleeping well. No sweats. No edema. No bruising or bleeding. No nausea or vomit. No diarrhea. No frequency, urgency, No depressive sxs. No anxiety. Denies sore throat, nasal congestion, nasal discharge, epistaxis, tinnitus, hearing loss, back pain, muscle pain, or joint pain. Current Outpatient Prescriptions   Medication Sig    lidocaine (LIDODERM) 5 %     LACTOSE-REDUCED FOOD (ENSURE PO) Take  by mouth.  carbidopa-levodopa (SINEMET)  mg per tablet Take 1.5 Tabs by mouth three (3) times daily.  DILANTIN 100 mg ER capsule TAKE 4 CAPSULES BY MOUTH ON EVEN DAYS AND 3 CAPSULES BY MOUTH ON ODD DAYS    ERGOCALCIFEROL, VITAMIN D2, (VITAMIN D2 PO) Take  by mouth.  traMADol-acetaminophen (ULTRACET) 37.5-325 mg per tablet Take one by mouth as needed for pain    ferrous sulfate, dried (IRON) 160 mg (50 mg iron) TbER Take  by mouth.  irbesartan (AVAPRO) 300 mg tablet Take 300 mg by mouth nightly.  nystatin-triamcinolone (MYCOLOG) 100,000-0.1 unit/gram-% ointment Apply  to affected area two (2) times a day.  multivitamin with iron tablet Take 1 Tab by mouth daily.  metFORMIN (GLUCOPHAGE) 500 mg tablet Take  by mouth two (2) times daily (with meals).  simvastatin (ZOCOR) 10 mg tablet Take  by mouth nightly.     gabapentin (NEURONTIN) 100 mg capsule TAKE 1 CAPSULE BY MOUTH THREE TIMES DAILY     No current facility-administered medications for this visit. No Known Allergies  Past Medical History:   Diagnosis Date    Anemia     Arthritis     DDD (degenerative disc disease)     Diabetes ([facility]     Diabetes mellitus with neuropathy ([facility]     Endogenous hyperlipemia     Hypertension     Parkinson disease ([facility]     [facility]      Past Surgical History:   Procedure Laterality Date    HX OOPHORECTOMY       Social History     Social History    Marital status:      Spouse name: N/A    Number of children: N/A    Years of education: N/A     Occupational History    Not on file. Social History Main Topics    Smoking status: Former Smoker     Quit date: 10/23/1995    Smokeless tobacco: Current User     Types: Chew    Alcohol use Yes    Drug use: No    Sexual activity: Not on file     Other Topics Concern    Not on file     Social History Narrative     History reviewed. No pertinent family history. PHYSICAL EXAMINATION:    Visit Vitals    /64    Pulse (!) 101    Resp 20    Ht 5' 5\" (1.651 m)    Wt 74.8 kg (165 lb)    SpO2 97%    BMI 27.46 kg/m2     General: Well defined, nourished, and groomed individual in no acute distress. Neck: Supple, nontender, possible right carotid bruit, no pain with resistance to active range of motion. Heart: Regular rate and rhythm, no murmurs, rub, or gallop. Normal S1S2. Lungs: Clear to auscultation bilaterally with equal chest expansion, no cough, no wheeze  Musculoskeletal: . Limited range of motion win LLE, swelling to L knee. Non-pitting edema to LLE. Psych: Good mood and bright affect      NEUROLOGICAL EXAMINATION:   Mental Status: Alert and oriented to person, place, and time with recent and remote memory intact. Attention span and concentration are normal. Speech is fluent with a full fund of knowledge.       Cranial Nerves:   II, III, IV, VI: Visual acuity grossly intact.  Visual fields are normal.   Pupils are equal, round, and reactive to light and accommodation. Extra-ocular movements are full and fluid. Fundoscopic exam was benign, no ptosis or nystagmus. V-XII: Hearing is grossly intact. Facial features are symmetric, with normal sensation and strength. The palate rises symmetrically and the tongue protrudes midline. Sternocleidomastoids 5/5.       Motor Examination: Normal tone, bulk, and strength, 5/5 muscle strength throughout. Mild bilateral cogwheel rigidity, left greater than right.       Coordination: Finger to nose and rapid arm movement testing revealed bradykinesia. Left resting or intention tremor      Gait and Station: Scissor gait, shuffled, decreased bilateral arm swing, L>R, left attenuation of the tremor, 7 step en bloc turn. No pronator drift. No muscle wasting or fasiculations noted.       Sensory: Minor sensory loss to bilateral toes. Temperature and vibratory sensation intact.     Reflexes: DTRs 2+ throughout. ASSESSMENT AND PLAN    ICD-10-CM ICD-9-CM    1. Partial epilepsy with impairment of consciousness ([facility] [address] G40.209 345.40    2. Parkinson disease ([address] G20 332.0    3. Right carotid bruit R09.89 785.9 DUPLEX CAROTID BILATERAL AMB NEURO     Patient and family were educated regarding Parkinson's disease to include the diagnosis, prognosis, progression, and treatment. We discussed the motor and nonmotor symptoms of her disease. Given her positive response to Sinemet, suspect that this is still her diagnosis. She will continue taking the Sinemet as previously prescribed. Epilepsy is stable. On today's exam, I did appreciate a mild right carotid bruit for which she will be assessed with a carotid Doppler. Follow-up in 3 months or sooner if needed. Rosa Rouse

## 2017-08-25 ENCOUNTER — TELEPHONE (OUTPATIENT)
Dept: NEUROLOGY | Age: 82
End: 2017-08-25

## 2017-08-31 DIAGNOSIS — G60.9 IDIOPATHIC PERIPHERAL NEUROPATHY: ICD-10-CM

## 2017-08-31 DIAGNOSIS — M54.31 SCIATICA OF RIGHT SIDE: ICD-10-CM

## 2017-08-31 RX ORDER — GABAPENTIN 100 MG/1
CAPSULE ORAL
Qty: 90 CAP | Refills: 0 | Status: SHIPPED | OUTPATIENT
Start: 2017-08-31 | End: 2017-11-19 | Stop reason: SDUPTHER

## 2017-08-31 NOTE — TELEPHONE ENCOUNTER
----- Message from Josiah Lundy sent at 8/31/2017 10:31 AM EDT -----  Regarding: GER Lee/Telephone  Pt stated that she would like for a call back in regards to a letter that she was to receive in the mail from the practice. She stated that it has been a week and she has not seen the letter yet. Her best contact number is [phone].

## 2017-09-01 ENCOUNTER — TELEPHONE (OUTPATIENT)
Dept: NEUROLOGY | Age: 82
End: 2017-09-01

## 2017-09-01 NOTE — TELEPHONE ENCOUNTER
----- Message from Amanda Gottlieb sent at 9/1/2017 10:00 AM EDT -----  Regarding: Jacqueline Lee/Telephone  Pt is wanting a call from [employer] or her nurse because she stated the [facility] in the front was suppose to send her by mail the medical release from on 8-25-17 so her medical information could be sent over to orthopedic doctor who is in [address] [place] [address] for her upcoming knee replacement which she will have in [address] [place] [address]  On 9-. Pt has not received the form to sign to have the information released to her orthopedic doctor. Pt would like a callback back today on this issue.  Pt best contact number is [phone], also the pt has a cell number which is [phone]

## 2017-10-18 ENCOUNTER — TELEPHONE (OUTPATIENT)
Dept: NEUROLOGY | Age: 82
End: 2017-10-18

## 2017-10-18 NOTE — TELEPHONE ENCOUNTER
----- Message from Champ Lopez sent at 10/17/2017  3:26 PM EDT -----  Regarding: GER Lee/Telephone  Pt called concerning speaking with the nurse and would like a call back. Pt best contact number [phone].

## 2017-10-18 NOTE — TELEPHONE ENCOUNTER
Called and spoke to patient and she is in a hospital in [place] and will not be in keeping her appt she will reschedule appt

## 2017-11-19 DIAGNOSIS — G60.9 IDIOPATHIC PERIPHERAL NEUROPATHY: ICD-10-CM

## 2017-11-19 DIAGNOSIS — M54.31 SCIATICA OF RIGHT SIDE: ICD-10-CM

## 2017-11-20 RX ORDER — GABAPENTIN 100 MG/1
CAPSULE ORAL
Qty: 90 CAP | Refills: 0 | Status: SHIPPED | OUTPATIENT
Start: 2017-11-20 | End: 2018-01-02 | Stop reason: SDUPTHER

## 2017-12-08 ENCOUNTER — HOME HEALTH ADMISSION (OUTPATIENT)
Dept: HOME HEALTH SERVICES | Facility: HOME HEALTH | Age: 82
End: 2017-12-08
Payer: MEDICARE

## 2017-12-09 ENCOUNTER — HOME CARE VISIT (OUTPATIENT)
Dept: SCHEDULING | Facility: HOME HEALTH | Age: 82
End: 2017-12-09
Payer: MEDICARE

## 2017-12-09 VITALS
DIASTOLIC BLOOD PRESSURE: 68 MMHG | SYSTOLIC BLOOD PRESSURE: 132 MMHG | TEMPERATURE: 98 F | OXYGEN SATURATION: 98 % | RESPIRATION RATE: 16 BRPM | HEART RATE: 115 BPM

## 2017-12-09 PROCEDURE — 400013 HH SOC

## 2017-12-09 PROCEDURE — G0151 HHCP-SERV OF PT,EA 15 MIN: HCPCS

## 2017-12-09 PROCEDURE — 3331090002 HH PPS REVENUE DEBIT

## 2017-12-09 PROCEDURE — 3331090001 HH PPS REVENUE CREDIT

## 2017-12-10 PROCEDURE — 3331090002 HH PPS REVENUE DEBIT

## 2017-12-10 PROCEDURE — 3331090001 HH PPS REVENUE CREDIT

## 2017-12-11 ENCOUNTER — HOME CARE VISIT (OUTPATIENT)
Dept: SCHEDULING | Facility: HOME HEALTH | Age: 82
End: 2017-12-11
Payer: MEDICARE

## 2017-12-11 VITALS
OXYGEN SATURATION: 99 % | RESPIRATION RATE: 18 BRPM | SYSTOLIC BLOOD PRESSURE: 128 MMHG | HEART RATE: 106 BPM | DIASTOLIC BLOOD PRESSURE: 68 MMHG | TEMPERATURE: 98.4 F

## 2017-12-11 PROCEDURE — 3331090002 HH PPS REVENUE DEBIT

## 2017-12-11 PROCEDURE — G0151 HHCP-SERV OF PT,EA 15 MIN: HCPCS

## 2017-12-11 PROCEDURE — 3331090001 HH PPS REVENUE CREDIT

## 2017-12-12 PROCEDURE — 3331090002 HH PPS REVENUE DEBIT

## 2017-12-12 PROCEDURE — 3331090001 HH PPS REVENUE CREDIT

## 2017-12-13 ENCOUNTER — HOME CARE VISIT (OUTPATIENT)
Dept: SCHEDULING | Facility: HOME HEALTH | Age: 82
End: 2017-12-13
Payer: MEDICARE

## 2017-12-13 VITALS
OXYGEN SATURATION: 99 % | DIASTOLIC BLOOD PRESSURE: 62 MMHG | HEART RATE: 93 BPM | SYSTOLIC BLOOD PRESSURE: 110 MMHG | RESPIRATION RATE: 18 BRPM | TEMPERATURE: 97.8 F

## 2017-12-13 PROCEDURE — G0151 HHCP-SERV OF PT,EA 15 MIN: HCPCS

## 2017-12-13 PROCEDURE — 3331090001 HH PPS REVENUE CREDIT

## 2017-12-13 PROCEDURE — 3331090002 HH PPS REVENUE DEBIT

## 2017-12-14 PROCEDURE — 3331090002 HH PPS REVENUE DEBIT

## 2017-12-14 PROCEDURE — 3331090001 HH PPS REVENUE CREDIT

## 2017-12-15 ENCOUNTER — HOME CARE VISIT (OUTPATIENT)
Dept: SCHEDULING | Facility: HOME HEALTH | Age: 82
End: 2017-12-15
Payer: MEDICARE

## 2017-12-15 PROCEDURE — 3331090002 HH PPS REVENUE DEBIT

## 2017-12-15 PROCEDURE — 3331090001 HH PPS REVENUE CREDIT

## 2017-12-15 PROCEDURE — G0151 HHCP-SERV OF PT,EA 15 MIN: HCPCS

## 2017-12-16 PROCEDURE — 3331090002 HH PPS REVENUE DEBIT

## 2017-12-16 PROCEDURE — 3331090001 HH PPS REVENUE CREDIT

## 2017-12-17 VITALS
RESPIRATION RATE: 16 BRPM | HEART RATE: 110 BPM | SYSTOLIC BLOOD PRESSURE: 122 MMHG | TEMPERATURE: 98.7 F | DIASTOLIC BLOOD PRESSURE: 68 MMHG | OXYGEN SATURATION: 99 %

## 2017-12-17 PROCEDURE — 3331090001 HH PPS REVENUE CREDIT

## 2017-12-17 PROCEDURE — 3331090002 HH PPS REVENUE DEBIT

## 2017-12-18 ENCOUNTER — HOME CARE VISIT (OUTPATIENT)
Dept: SCHEDULING | Facility: HOME HEALTH | Age: 82
End: 2017-12-18
Payer: MEDICARE

## 2017-12-18 PROCEDURE — 3331090001 HH PPS REVENUE CREDIT

## 2017-12-18 PROCEDURE — G0151 HHCP-SERV OF PT,EA 15 MIN: HCPCS

## 2017-12-18 PROCEDURE — 3331090002 HH PPS REVENUE DEBIT

## 2017-12-19 VITALS
DIASTOLIC BLOOD PRESSURE: 62 MMHG | RESPIRATION RATE: 20 BRPM | OXYGEN SATURATION: 99 % | TEMPERATURE: 98.5 F | HEART RATE: 89 BPM | SYSTOLIC BLOOD PRESSURE: 120 MMHG

## 2017-12-19 PROCEDURE — 3331090002 HH PPS REVENUE DEBIT

## 2017-12-19 PROCEDURE — 3331090001 HH PPS REVENUE CREDIT

## 2017-12-20 ENCOUNTER — HOME CARE VISIT (OUTPATIENT)
Dept: SCHEDULING | Facility: HOME HEALTH | Age: 82
End: 2017-12-20
Payer: MEDICARE

## 2017-12-20 PROCEDURE — 3331090002 HH PPS REVENUE DEBIT

## 2017-12-20 PROCEDURE — 3331090001 HH PPS REVENUE CREDIT

## 2017-12-20 PROCEDURE — G0151 HHCP-SERV OF PT,EA 15 MIN: HCPCS

## 2017-12-21 PROCEDURE — 3331090001 HH PPS REVENUE CREDIT

## 2017-12-21 PROCEDURE — 3331090002 HH PPS REVENUE DEBIT

## 2017-12-22 ENCOUNTER — HOME CARE VISIT (OUTPATIENT)
Dept: HOME HEALTH SERVICES | Facility: HOME HEALTH | Age: 82
End: 2017-12-22
Payer: MEDICARE

## 2017-12-22 VITALS
DIASTOLIC BLOOD PRESSURE: 58 MMHG | RESPIRATION RATE: 18 BRPM | OXYGEN SATURATION: 98 % | SYSTOLIC BLOOD PRESSURE: 110 MMHG | TEMPERATURE: 99.1 F | HEART RATE: 103 BPM

## 2017-12-22 VITALS
RESPIRATION RATE: 20 BRPM | TEMPERATURE: 98.9 F | OXYGEN SATURATION: 98 % | HEART RATE: 103 BPM | DIASTOLIC BLOOD PRESSURE: 60 MMHG | SYSTOLIC BLOOD PRESSURE: 108 MMHG

## 2017-12-22 PROCEDURE — G0157 HHC PT ASSISTANT EA 15: HCPCS

## 2017-12-22 PROCEDURE — 3331090001 HH PPS REVENUE CREDIT

## 2017-12-22 PROCEDURE — 3331090002 HH PPS REVENUE DEBIT

## 2017-12-23 PROCEDURE — 3331090001 HH PPS REVENUE CREDIT

## 2017-12-23 PROCEDURE — 3331090002 HH PPS REVENUE DEBIT

## 2017-12-24 PROCEDURE — 3331090001 HH PPS REVENUE CREDIT

## 2017-12-24 PROCEDURE — 3331090002 HH PPS REVENUE DEBIT

## 2017-12-25 PROCEDURE — 3331090001 HH PPS REVENUE CREDIT

## 2017-12-25 PROCEDURE — 3331090002 HH PPS REVENUE DEBIT

## 2017-12-26 ENCOUNTER — HOME CARE VISIT (OUTPATIENT)
Dept: HOME HEALTH SERVICES | Facility: HOME HEALTH | Age: 82
End: 2017-12-26
Payer: MEDICARE

## 2017-12-26 PROCEDURE — 3331090002 HH PPS REVENUE DEBIT

## 2017-12-26 PROCEDURE — 3331090001 HH PPS REVENUE CREDIT

## 2017-12-27 ENCOUNTER — HOME CARE VISIT (OUTPATIENT)
Dept: HOME HEALTH SERVICES | Facility: HOME HEALTH | Age: 82
End: 2017-12-27
Payer: MEDICARE

## 2017-12-27 PROCEDURE — 3331090001 HH PPS REVENUE CREDIT

## 2017-12-27 PROCEDURE — 3331090002 HH PPS REVENUE DEBIT

## 2017-12-28 PROCEDURE — 3331090002 HH PPS REVENUE DEBIT

## 2017-12-28 PROCEDURE — 3331090001 HH PPS REVENUE CREDIT

## 2017-12-29 ENCOUNTER — HOME CARE VISIT (OUTPATIENT)
Dept: SCHEDULING | Facility: HOME HEALTH | Age: 82
End: 2017-12-29
Payer: MEDICARE

## 2017-12-29 PROCEDURE — 3331090001 HH PPS REVENUE CREDIT

## 2017-12-29 PROCEDURE — 3331090002 HH PPS REVENUE DEBIT

## 2017-12-29 PROCEDURE — G0151 HHCP-SERV OF PT,EA 15 MIN: HCPCS

## 2017-12-30 VITALS
HEART RATE: 99 BPM | TEMPERATURE: 98 F | SYSTOLIC BLOOD PRESSURE: 116 MMHG | RESPIRATION RATE: 18 BRPM | DIASTOLIC BLOOD PRESSURE: 60 MMHG | OXYGEN SATURATION: 99 %

## 2017-12-30 PROCEDURE — 3331090001 HH PPS REVENUE CREDIT

## 2017-12-30 PROCEDURE — 3331090002 HH PPS REVENUE DEBIT

## 2017-12-31 PROCEDURE — 3331090002 HH PPS REVENUE DEBIT

## 2017-12-31 PROCEDURE — 3331090001 HH PPS REVENUE CREDIT

## 2018-01-01 PROCEDURE — 3331090001 HH PPS REVENUE CREDIT

## 2018-01-01 PROCEDURE — 3331090002 HH PPS REVENUE DEBIT

## 2018-01-02 DIAGNOSIS — M54.31 SCIATICA OF RIGHT SIDE: ICD-10-CM

## 2018-01-02 DIAGNOSIS — G60.9 IDIOPATHIC PERIPHERAL NEUROPATHY: ICD-10-CM

## 2018-01-02 PROCEDURE — 3331090001 HH PPS REVENUE CREDIT

## 2018-01-02 PROCEDURE — 3331090002 HH PPS REVENUE DEBIT

## 2018-01-02 RX ORDER — GABAPENTIN 100 MG/1
CAPSULE ORAL
Qty: 90 CAP | Refills: 0 | Status: SHIPPED | OUTPATIENT
Start: 2018-01-02 | End: 2018-02-16 | Stop reason: SDUPTHER

## 2018-01-03 ENCOUNTER — HOME CARE VISIT (OUTPATIENT)
Dept: HOME HEALTH SERVICES | Facility: HOME HEALTH | Age: 83
End: 2018-01-03
Payer: MEDICARE

## 2018-01-03 PROCEDURE — 3331090002 HH PPS REVENUE DEBIT

## 2018-01-03 PROCEDURE — 3331090001 HH PPS REVENUE CREDIT

## 2018-01-04 ENCOUNTER — HOME CARE VISIT (OUTPATIENT)
Dept: HOME HEALTH SERVICES | Facility: HOME HEALTH | Age: 83
End: 2018-01-04
Payer: MEDICARE

## 2018-01-04 PROCEDURE — 3331090002 HH PPS REVENUE DEBIT

## 2018-01-04 PROCEDURE — 3331090001 HH PPS REVENUE CREDIT

## 2018-01-05 ENCOUNTER — HOME CARE VISIT (OUTPATIENT)
Dept: HOME HEALTH SERVICES | Facility: HOME HEALTH | Age: 83
End: 2018-01-05
Payer: MEDICARE

## 2018-01-05 PROCEDURE — 3331090002 HH PPS REVENUE DEBIT

## 2018-01-05 PROCEDURE — 3331090001 HH PPS REVENUE CREDIT

## 2018-01-06 PROCEDURE — 3331090002 HH PPS REVENUE DEBIT

## 2018-01-06 PROCEDURE — 3331090001 HH PPS REVENUE CREDIT

## 2018-01-07 PROCEDURE — 3331090002 HH PPS REVENUE DEBIT

## 2018-01-07 PROCEDURE — 3331090001 HH PPS REVENUE CREDIT

## 2018-01-08 ENCOUNTER — HOME CARE VISIT (OUTPATIENT)
Dept: SCHEDULING | Facility: HOME HEALTH | Age: 83
End: 2018-01-08
Payer: MEDICARE

## 2018-01-08 VITALS
DIASTOLIC BLOOD PRESSURE: 58 MMHG | OXYGEN SATURATION: 98 % | RESPIRATION RATE: 16 BRPM | SYSTOLIC BLOOD PRESSURE: 120 MMHG | HEART RATE: 101 BPM | TEMPERATURE: 98.3 F

## 2018-01-08 PROCEDURE — 3331090001 HH PPS REVENUE CREDIT

## 2018-01-08 PROCEDURE — 3331090002 HH PPS REVENUE DEBIT

## 2018-01-08 PROCEDURE — G0157 HHC PT ASSISTANT EA 15: HCPCS

## 2018-01-09 PROCEDURE — 3331090001 HH PPS REVENUE CREDIT

## 2018-01-09 PROCEDURE — 3331090002 HH PPS REVENUE DEBIT

## 2018-01-10 PROCEDURE — 3331090002 HH PPS REVENUE DEBIT

## 2018-01-10 PROCEDURE — 3331090001 HH PPS REVENUE CREDIT

## 2018-01-11 ENCOUNTER — HOME CARE VISIT (OUTPATIENT)
Dept: SCHEDULING | Facility: HOME HEALTH | Age: 83
End: 2018-01-11
Payer: MEDICARE

## 2018-01-11 PROCEDURE — 3331090002 HH PPS REVENUE DEBIT

## 2018-01-11 PROCEDURE — G0157 HHC PT ASSISTANT EA 15: HCPCS

## 2018-01-11 PROCEDURE — 3331090001 HH PPS REVENUE CREDIT

## 2018-01-12 VITALS
DIASTOLIC BLOOD PRESSURE: 58 MMHG | SYSTOLIC BLOOD PRESSURE: 112 MMHG | HEART RATE: 110 BPM | OXYGEN SATURATION: 97 % | TEMPERATURE: 97.9 F | RESPIRATION RATE: 16 BRPM

## 2018-01-12 PROCEDURE — 3331090002 HH PPS REVENUE DEBIT

## 2018-01-12 PROCEDURE — 3331090001 HH PPS REVENUE CREDIT

## 2018-01-13 PROCEDURE — 3331090001 HH PPS REVENUE CREDIT

## 2018-01-13 PROCEDURE — 3331090002 HH PPS REVENUE DEBIT

## 2018-01-14 PROCEDURE — 3331090001 HH PPS REVENUE CREDIT

## 2018-01-14 PROCEDURE — 3331090002 HH PPS REVENUE DEBIT

## 2018-01-15 ENCOUNTER — HOME CARE VISIT (OUTPATIENT)
Dept: SCHEDULING | Facility: HOME HEALTH | Age: 83
End: 2018-01-15
Payer: MEDICARE

## 2018-01-15 VITALS
SYSTOLIC BLOOD PRESSURE: 122 MMHG | RESPIRATION RATE: 20 BRPM | HEART RATE: 106 BPM | TEMPERATURE: 97.7 F | OXYGEN SATURATION: 99 % | DIASTOLIC BLOOD PRESSURE: 68 MMHG

## 2018-01-15 PROCEDURE — 3331090001 HH PPS REVENUE CREDIT

## 2018-01-15 PROCEDURE — 3331090002 HH PPS REVENUE DEBIT

## 2018-01-15 PROCEDURE — G0151 HHCP-SERV OF PT,EA 15 MIN: HCPCS

## 2018-01-16 PROCEDURE — 3331090001 HH PPS REVENUE CREDIT

## 2018-01-16 PROCEDURE — 3331090002 HH PPS REVENUE DEBIT

## 2018-01-17 PROCEDURE — 3331090001 HH PPS REVENUE CREDIT

## 2018-01-17 PROCEDURE — 3331090002 HH PPS REVENUE DEBIT

## 2018-01-18 ENCOUNTER — HOME CARE VISIT (OUTPATIENT)
Dept: SCHEDULING | Facility: HOME HEALTH | Age: 83
End: 2018-01-18
Payer: MEDICARE

## 2018-01-18 VITALS
DIASTOLIC BLOOD PRESSURE: 68 MMHG | RESPIRATION RATE: 16 BRPM | OXYGEN SATURATION: 99 % | SYSTOLIC BLOOD PRESSURE: 112 MMHG | HEART RATE: 106 BPM | TEMPERATURE: 98.6 F

## 2018-01-18 PROCEDURE — G0151 HHCP-SERV OF PT,EA 15 MIN: HCPCS

## 2018-01-18 PROCEDURE — 3331090001 HH PPS REVENUE CREDIT

## 2018-01-18 PROCEDURE — 3331090002 HH PPS REVENUE DEBIT

## 2018-01-19 PROCEDURE — 3331090002 HH PPS REVENUE DEBIT

## 2018-01-19 PROCEDURE — 3331090001 HH PPS REVENUE CREDIT

## 2018-01-20 PROCEDURE — 3331090002 HH PPS REVENUE DEBIT

## 2018-01-20 PROCEDURE — 3331090001 HH PPS REVENUE CREDIT

## 2018-01-21 PROCEDURE — 3331090002 HH PPS REVENUE DEBIT

## 2018-01-21 PROCEDURE — 3331090001 HH PPS REVENUE CREDIT

## 2018-01-22 PROCEDURE — 3331090001 HH PPS REVENUE CREDIT

## 2018-01-22 PROCEDURE — 3331090002 HH PPS REVENUE DEBIT

## 2018-01-23 ENCOUNTER — HOME CARE VISIT (OUTPATIENT)
Dept: SCHEDULING | Facility: HOME HEALTH | Age: 83
End: 2018-01-23
Payer: MEDICARE

## 2018-01-23 VITALS
DIASTOLIC BLOOD PRESSURE: 68 MMHG | RESPIRATION RATE: 18 BRPM | TEMPERATURE: 98.4 F | OXYGEN SATURATION: 99 % | SYSTOLIC BLOOD PRESSURE: 128 MMHG | HEART RATE: 93 BPM

## 2018-01-23 PROCEDURE — G0151 HHCP-SERV OF PT,EA 15 MIN: HCPCS

## 2018-01-23 PROCEDURE — 3331090001 HH PPS REVENUE CREDIT

## 2018-01-23 PROCEDURE — 3331090002 HH PPS REVENUE DEBIT

## 2018-01-24 PROCEDURE — 3331090002 HH PPS REVENUE DEBIT

## 2018-01-24 PROCEDURE — 3331090001 HH PPS REVENUE CREDIT

## 2018-01-25 PROCEDURE — 3331090001 HH PPS REVENUE CREDIT

## 2018-01-25 PROCEDURE — 3331090002 HH PPS REVENUE DEBIT

## 2018-01-26 ENCOUNTER — HOME CARE VISIT (OUTPATIENT)
Dept: SCHEDULING | Facility: HOME HEALTH | Age: 83
End: 2018-01-26
Payer: MEDICARE

## 2018-01-26 PROCEDURE — 3331090003 HH PPS REVENUE ADJ

## 2018-01-26 PROCEDURE — G0151 HHCP-SERV OF PT,EA 15 MIN: HCPCS

## 2018-01-26 PROCEDURE — 3331090002 HH PPS REVENUE DEBIT

## 2018-01-26 PROCEDURE — 3331090001 HH PPS REVENUE CREDIT

## 2018-01-27 PROCEDURE — 3331090001 HH PPS REVENUE CREDIT

## 2018-01-27 PROCEDURE — 3331090002 HH PPS REVENUE DEBIT

## 2018-01-28 VITALS
RESPIRATION RATE: 16 BRPM | HEART RATE: 96 BPM | OXYGEN SATURATION: 99 % | TEMPERATURE: 98.1 F | DIASTOLIC BLOOD PRESSURE: 68 MMHG | SYSTOLIC BLOOD PRESSURE: 130 MMHG

## 2018-01-28 PROCEDURE — 3331090001 HH PPS REVENUE CREDIT

## 2018-01-28 PROCEDURE — 3331090002 HH PPS REVENUE DEBIT

## 2018-03-02 ENCOUNTER — TELEPHONE (OUTPATIENT)
Dept: NEUROLOGY | Age: 83
End: 2018-03-02

## 2018-03-02 NOTE — TELEPHONE ENCOUNTER
----- Message from Fe Vallecillo sent at 3/1/2018  3:47 PM EST -----  Regarding: GER Lowe/telephone  Pt would like a call back from Summer or GER Lau regarding issues she has having with walking. Pt wants to know if it is the parkinson's, because she has been doing better at walking and has been riding the bicycle which has her with her legs. Pt can be reached at [phone].

## 2018-03-12 ENCOUNTER — OFFICE VISIT (OUTPATIENT)
Dept: NEUROLOGY | Age: 83
End: 2018-03-12

## 2018-03-12 VITALS
DIASTOLIC BLOOD PRESSURE: 78 MMHG | HEIGHT: 65 IN | SYSTOLIC BLOOD PRESSURE: 124 MMHG | BODY MASS INDEX: 27.49 KG/M2 | WEIGHT: 165 LBS

## 2018-03-12 DIAGNOSIS — G20 PARKINSON'S DISEASE (HCC): ICD-10-CM

## 2018-03-12 RX ORDER — CARBIDOPA AND LEVODOPA 25; 100 MG/1; MG/1
TABLET ORAL
Qty: 180 TAB | Refills: 5 | Status: SHIPPED | OUTPATIENT
Start: 2018-03-12 | End: 2018-05-15 | Stop reason: SDUPTHER

## 2018-03-12 NOTE — PROGRESS NOTES
Patient is here for follow up. She is accompanied by her daughter. She states tremors are a little worse.

## 2018-03-12 NOTE — MR AVS SNAPSHOT
[address] [facility] [address] [facility] [id number] [phone] Patient: Deborah Lagos MRN: [id number] :1935 Visit Information Date & Time Provider Department Dept. Phone Encounter #  
 3/12/2018  2:00 PM Marques Allison NP [facility] [phone] [id number] Follow-up Instructions Return in about 2 months (around 2018). Upcoming Health Maintenance Date Due  
 FOOT EXAM Q1 1945 MICROALBUMIN Q1 1946 EYE EXAM RETINAL OR DILATED Q1 1946 DTaP/Tdap/Td series (1 - Tdap) 1957 ZOSTER VACCINE AGE 60> 1996 GLAUCOMA SCREENING Q2Y 2001 Bone Densitometry (Dexa) Screening 2001 Pneumococcal 65+ Low/Medium Risk (1 of 2 - PCV13) 2001 MEDICARE YEARLY EXAM 2001 HEMOGLOBIN A1C Q6M 2017 LIPID PANEL Q1 2017 Influenza Age 5 to Adult 2017 Allergies as of 3/12/2018  Review Complete On: 2017 By: Meng Lee PT Severity Noted Reaction Type Reactions Grape High 2018    Anaphylaxis Current Immunizations  Never Reviewed No immunizations on file. Not reviewed this visit You Were Diagnosed With   
  
 Codes Comments [facility] ([facility]     ICD-10-CM: G20 
ICD-9-CM: 332.0 Vitals BP Height(growth percentile) Weight(growth percentile) BMI OB Status Smoking Status 124/78 5' 5\" (1.651 m) 165 lb (74.8 kg) 27.46 kg/m2 Hysterectomy Former Smoker Vitals History BMI and BSA Data Body Mass Index Body Surface Area  
 27.46 kg/m 2 1.85 m 2 Preferred Pharmacy Pharmacy Name Phone [phone], [address] [place] [address] [phone] Your Updated Medication List  
  
   
This list is accurate as of 3/12/18  2:32 PM.  Always use your most recent med list.  
  
  
  
  
 acetaminophen 500 mg tablet Commonly known as:  TYLENOL Take 500 mg by mouth every six (6) hours as needed for Pain. aspirin delayed-release 81 mg tablet Take 81 mg by mouth daily. AVAPRO 300 mg tablet Generic drug:  irbesartan Take 300 mg by mouth nightly. carbidopa-levodopa  mg per tablet Commonly known as:  SINEMET Take 2 tablets by mouth TID Dilantin 100 mg ER capsule Generic drug:  phenytoin ER  
TAKE 4 CAPSULES BY MOUTH ON EVEN DAYS AND 3 CAPSULES BY MOUTH ON ODD DAYS  
  
 ferrous sulfate 325 mg (65 mg iron) tablet Take 325 mg by mouth daily. lidocaine 5 % Commonly known as:  LIDODERM  
  
 metFORMIN 500 mg tablet Commonly known as:  GLUCOPHAGE Take  by mouth two (2) times daily (with meals). multivitamin with iron tablet Take 1 Tab by mouth daily. VITAMIN D2 PO Take 2,000 Units by mouth daily. ZOCOR 10 mg tablet Generic drug:  simvastatin Take  by mouth nightly. Prescriptions Sent to Pharmacy Refills  
 carbidopa-levodopa (SINEMET)  mg per tablet 5 Sig: Take 2 tablets by mouth TID Class: Normal  
 Pharmacy: [email or web address] [address] [place] [address] #: [phone] We Performed the Following REFERRAL TO PHYSICAL THERAPY [EBI26 Custom] Comments: LSVTBIG Follow-up Instructions Return in about 2 months (around 5/12/2018). Referral Information Referral ID Referred By Referred To  
  
 [id number] Ivone Singh Not Available Visits Status Start Date End Date 1 New Request 3/12/18 3/12/19 If your referral has a status of pending review or denied, additional information will be sent to support the outcome of this decision. Patient Instructions PRESCRIPTION REFILL POLICY [facility] Statement to Patients April 1, 2014 In an effort to ensure the large volume of patient prescription refills is processed in the most efficient and expeditious manner, we are asking our patients to assist us by calling your Pharmacy for all prescription refills, this will include also your  Mail Order Pharmacy. The pharmacy will contact our office electronically to continue the refill process. Please do not wait until the last minute to call your pharmacy. We need at least 48 hours (2days) to fill prescriptions. We also encourage you to call your pharmacy before going to  your prescription to make sure it is ready. With regard to controlled substance prescription refill requests (narcotic refills) that need to be picked up at our office, we ask your cooperation by providing us with at least 72 hours (3days) notice that you will need a refill. We will not refill narcotic prescription refill requests after 4:00pm on any weekday, Monday through Thursday, or after 2:00pm on Fridays, or on the weekends. We encourage everyone to explore another way of getting your prescription refill request processed using [employer], our patient web portal through our electronic medical record system. [employer] is an efficient and effective way to communicate your medication request directly to the office and  downloadable as an geovany on your smart phone . [employer] also features a review functionality that allows you to view your medication list as well as leave messages for your physician. Are you ready to get connected? If so please review the attatched instructions or speak to any of our staff to get you set up right away! Thank you so much for your cooperation. Should you have any questions please contact our Practice Administrator. The Physicians and Staff,  [facility] Introducing [facility]! [facility] introduces [employer] patient portal. Now you can access parts of your medical record, email your doctor's office, and request medication refills online.    
 
1. In your internet browser, go to [email or web address] 2. Click on the First Time User? Click Here link in the Sign In box. You will see the New Member Sign Up page. 3. Enter your [employer] Access Code exactly as it appears below. You will not need to use this code after youve completed the sign-up process. If you do not sign up before the expiration date, you must request a new code. · [employer] Access Code: [email or web address] Expires: 6/10/2018  2:32 PM 
 
4. Enter the last four digits of your Social Security Number (xxxx) and Date of Birth (mm/dd/yyyy) as indicated and click Submit. You will be taken to the next sign-up page. 5. Create a [employer] ID. This will be your [employer] login ID and cannot be changed, so think of one that is secure and easy to remember. 6. Create a [employer] password. You can change your password at any time. 7. Enter your Password Reset Question and Answer. This can be used at a later time if you forget your password. 8. Enter your e-mail address. You will receive e-mail notification when new information is available in [address] 9. Click Sign Up. You can now view and download portions of your medical record. 10. Click the Download Summary menu link to download a portable copy of your medical information. If you have questions, please visit the Frequently Asked Questions section of the [employer] website. Remember, [employer] is NOT to be used for urgent needs. For medical emergencies, dial 911. Now available from your iPhone and Android! Please provide this summary of care documentation to your next provider. Your primary care clinician is listed as Sarika Rick. If you have any questions after today's visit, please call [phone].

## 2018-03-12 NOTE — PROGRESS NOTES
Sabine Ndiaye is a 81 y.o. female who presents with the following  Chief Complaint   Patient presents with    Follow-up     parkinson       HPI Patient with Parkinson and partial epilepsy comes in for a follow up for seizures. She states she has been doing well since last visit. Had a left knee replacement done in [address] [place] [address] and has been doing well since. She states she has some overall trouble with mobility, balance, gait, and movement. She has a walker she uses to get around her house. She does live alone but has everything she needs. Also has noticed that she had some increase tremor and shaking. She has not fallen but has come close due to her balance. She has neuropathy she states but wants to come off Gabapentin. On Dil[place] for seizures and has been on this for years and years. No seizures in so long she can not remember. Allergies   Allergen Reactions    Grape Anaphylaxis       Current Outpatient Prescriptions   Medication Sig    carbidopa-levodopa (SINEMET)  mg per tablet Take 2 tablets by mouth TID    ERGOCALCIFEROL, VITAMIN D2, (VITAMIN D2 PO) Take 2,000 Units by mouth daily.  ferrous sulfate 325 mg (65 mg iron) tablet Take 325 mg by mouth daily.  acetaminophen (TYLENOL) 500 mg tablet Take 500 mg by mouth every six (6) hours as needed for Pain.  aspirin delayed-release 81 mg tablet Take 81 mg by mouth daily.  lidocaine (LIDODERM) 5 %     DILANTIN 100 mg ER capsule TAKE 4 CAPSULES BY MOUTH ON EVEN DAYS AND 3 CAPSULES BY MOUTH ON ODD DAYS    irbesartan (AVAPRO) 300 mg tablet Take 300 mg by mouth nightly.  multivitamin with iron tablet Take 1 Tab by mouth daily.  metFORMIN (GLUCOPHAGE) 500 mg tablet Take  by mouth two (2) times daily (with meals).  simvastatin (ZOCOR) 10 mg tablet Take  by mouth nightly. No current facility-administered medications for this visit.         History   Smoking Status    Former Smoker    Quit date: 10/23/1995   Smokeless Tobacco    Current User    Types: Chew       Past Medical History:   Diagnosis Date    Anemia     Arthritis     DDD (degenerative disc disease)     Diabetes ([facility]     Diabetes mellitus with neuropathy ([facility]     Endogenous hyperlipemia     Hypertension     Parkinson disease ([facility]     Seizures ([facility]        Past Surgical History:   Procedure Laterality Date    HX KNEE REPLACEMENT Left 10/03/2017    HX OOPHORECTOMY         History reviewed. No pertinent family history. Social History     Social History    Marital status:      Spouse name: N/A    Number of children: N/A    Years of education: N/A     Social History Main Topics    Smoking status: Former Smoker     Quit date: 10/23/1995    Smokeless tobacco: Current User     Types: Chew    Alcohol use Yes    Drug use: No    Sexual activity: Not Asked     Other Topics Concern    None     Social History Narrative       Review of Systems   Constitutional: Positive for malaise/fatigue. Eyes: Negative for blurred vision and double vision. Respiratory: Negative for shortness of breath and wheezing. Cardiovascular: Negative for chest pain and palpitations. Gastrointestinal: Negative for nausea and vomiting. Musculoskeletal: Negative for falls. Neurological: Positive for tremors, sensory change and weakness. Negative for dizziness, tingling, seizures, loss of consciousness and headaches. Remainder of comprehensive review is negative. Physical Exam :    Visit Vitals    /78    Ht 5' 5\" (1.651 m)    Wt 74.8 kg (165 lb)    BMI 27.46 kg/m2       General: Well defined, nourished, and groomed individual in no acute distress.    Neck: Supple, nontender, no bruits, no pain with resistance to active range of motion.    Heart: Regular rate and rhythm, no murmurs, rub, or gallop. Normal S1S2.   Lungs: Clear to auscultation bilaterally with equal chest expansion, no cough, no wheeze  Musculoskeletal: Extremities revealed no edema and had full range of motion of joints.  moderate cogwheel bilateral wrists. Psych: Good mood and bright affect    NEUROLOGICAL EXAMINATION:    Mental Status: Alert and oriented to person, place, and time    Cranial Nerves:    II, III, IV, VI: Visual acuity grossly intact. Visual fields are normal.    Pupils are equal, round, and reactive to light and accommodation.    Extra-ocular movements are full and fluid. Fundoscopic exam was benign, no ptosis or nystagmus.    V-XII: Hearing is grossly intact. Facial features are symmetric, with normal sensation and strength. The palate rises symmetrically and the tongue protrudes midline. Sternocleidomastoids 5/5. Motor Examination: Normal tone, bulk, and strength, 4/5 muscle strength throughout. Coordination: mild to moderate resting tremor bilateral UE     Gait and Station:  Slow and unsteady. Assistance needed     Reflexes: DTRs 1+ throughout. Results for orders placed or performed in visit on 11/11/16   AMB EXT CREATININE   Result Value Ref Range    Creatinine, External 0.96    AMB EXT HGBA1C   Result Value Ref Range    Hemoglobin A1c, External 5.9    AMB EXT LDL-C   Result Value Ref Range    LDL-C, External 71        Orders Placed This Encounter    REFERRAL TO PHYSICAL THERAPY     Referral Priority:   Routine     Referral Type:   PT/OT/ST     Referral Reason:   Specialty Services Required    carbidopa-levodopa (SINEMET)  mg per tablet     Sig: Take 2 tablets by mouth TID     Dispense:  180 Tab     Refill:  5       1. [facility]'s [facility] ([facility]        Follow-up Disposition:  Return in about 2 months (around 5/12/2018). Parkinson disease disturbance causing more mobility issues. [facility]. We will also increase Sinemet to 2 tablets TID as to help with her symptoms further. We discussed parkinson disease. Keep Dilantin for seizure prevention.  Wants to come off Gabapentin as she states she is taking too much medications. Follow with Dr. Deann Sosa after some therapy and med adjustment. Should feel better.          This note will not be viewable in [employer]

## 2018-03-12 NOTE — PATIENT INSTRUCTIONS
[facility]   Statement to Patients  April 1, 2014      In an effort to ensure the large volume of patient prescription refills is processed in the most efficient and expeditious manner, we are asking our patients to assist us by calling your Pharmacy for all prescription refills, this will include also your  Mail Order Pharmacy. The pharmacy will contact our office electronically to continue the refill process. Please do not wait until the last minute to call your pharmacy. We need at least 48 hours (2days) to fill prescriptions. We also encourage you to call your pharmacy before going to  your prescription to make sure it is ready. With regard to controlled substance prescription refill requests (narcotic refills) that need to be picked up at our office, we ask your cooperation by providing us with at least 72 hours (3days) notice that you will need a refill. We will not refill narcotic prescription refill requests after 4:00pm on any weekday, Monday through Thursday, or after 2:00pm on Fridays, or on the weekends. We encourage everyone to explore another way of getting your prescription refill request processed using [employer], our patient web portal through our electronic medical record system. [employer] is an efficient and effective way to communicate your medication request directly to the office and  downloadable as an geovany on your smart phone . [employer] also features a review functionality that allows you to view your medication list as well as leave messages for your physician. Are you ready to get connected? If so please review the attatched instructions or speak to any of our staff to get you set up right away! Thank you so much for your cooperation. Should you have any questions please contact our Practice Administrator.     The Physicians and Staff,  [facility]

## 2018-03-15 ENCOUNTER — TELEPHONE (OUTPATIENT)
Dept: NEUROLOGY | Age: 83
End: 2018-03-15

## 2018-05-15 ENCOUNTER — OFFICE VISIT (OUTPATIENT)
Dept: NEUROLOGY | Age: 83
End: 2018-05-15

## 2018-05-15 VITALS
SYSTOLIC BLOOD PRESSURE: 128 MMHG | RESPIRATION RATE: 18 BRPM | TEMPERATURE: 98.7 F | WEIGHT: 156.5 LBS | BODY MASS INDEX: 26.08 KG/M2 | HEART RATE: 90 BPM | HEIGHT: 65 IN | DIASTOLIC BLOOD PRESSURE: 70 MMHG | OXYGEN SATURATION: 98 %

## 2018-05-15 DIAGNOSIS — G40.209 PARTIAL EPILEPSY WITH IMPAIRMENT OF CONSCIOUSNESS (HCC): ICD-10-CM

## 2018-05-15 DIAGNOSIS — G40.209 LOCALIZATION-RELATED SYMPTOMATIC EPILEPSY AND EPILEPTIC SYNDROMES WITH COMPLEX PARTIAL SEIZURES, NOT INTRACTABLE, WITHOUT STATUS EPILEPTICUS (HCC): ICD-10-CM

## 2018-05-15 DIAGNOSIS — G62.9 NEUROPATHY: ICD-10-CM

## 2018-05-15 DIAGNOSIS — G20 PARKINSON'S DISEASE (HCC): ICD-10-CM

## 2018-05-15 DIAGNOSIS — G20 PARKINSON DISEASE (HCC): Primary | ICD-10-CM

## 2018-05-15 RX ORDER — GABAPENTIN 300 MG/1
300-600 CAPSULE ORAL AS NEEDED
COMMUNITY
End: 2018-12-12 | Stop reason: SDUPTHER

## 2018-05-15 RX ORDER — CARBIDOPA AND LEVODOPA 25; 100 MG/1; MG/1
TABLET ORAL
Qty: 180 TAB | Refills: 6 | Status: SHIPPED | OUTPATIENT
Start: 2018-05-15 | End: 2019-01-30 | Stop reason: SDUPTHER

## 2018-05-15 RX ORDER — PHENYTOIN SODIUM EXTENDED 100 MG
CAPSULE ORAL
Qty: 360 CAP | Refills: 1 | Status: SHIPPED | OUTPATIENT
Start: 2018-05-15 | End: 2018-05-23 | Stop reason: SDUPTHER

## 2018-05-15 NOTE — MR AVS SNAPSHOT
[address] [place] [address] [address] [facility] [id number] [phone] Patient: Karla Pickering MRN: [id number] :1935 Visit Information Date & Time Provider Department Dept. Phone Encounter #  
 5/15/2018  1:40 PM MD Sharon Salgado Neurology [facility] [phone] [id number] Follow-up Instructions Return in about 6 months (around 11/15/2018). Upcoming Health Maintenance Date Due  
 FOOT EXAM Q1 1945 MICROALBUMIN Q1 1946 EYE EXAM RETINAL OR DILATED Q1 1946 DTaP/Tdap/Td series (1 - Tdap) 1957 ZOSTER VACCINE AGE 60> 1996 GLAUCOMA SCREENING Q2Y 2001 Bone Densitometry (Dexa) Screening 2001 Pneumococcal 65+ Low/Medium Risk (1 of 2 - PCV13) 2001 HEMOGLOBIN A1C Q6M 2017 LIPID PANEL Q1 2017 MEDICARE YEARLY EXAM 3/14/2018 Influenza Age 5 to Adult 2018 Allergies as of 5/15/2018  Review Complete On: 5/15/2018 By: Brenda Silva MD  
  
 Severity Noted Reaction Type Reactions Grape High 2018    Anaphylaxis Current Immunizations  Never Reviewed No immunizations on file. Not reviewed this visit You Were Diagnosed With   
  
 Codes Comments Parkinson disease ([facility]    -  Primary ICD-10-CM: G20 
ICD-9-CM: 332.0 Localization-related symptomatic epilepsy and epileptic syndromes with complex partial seizures, not intractable, without status epilepticus ([facility]     ICD-10-CM: J50.907 ICD-9-CM: 345.40 Partial epilepsy with impairment of consciousness ([facility]     ICD-10-CM: R00.760 ICD-9-CM: 345.40 Parkinson's disease ([facility]     ICD-10-CM: G20 
ICD-9-CM: 332.0 Neuropathy     ICD-10-CM: G62.9 ICD-9-CM: 574. 9 Vitals BP Pulse Temp Resp Height(growth percentile) Weight(growth percentile) 128/70 90 98.7 °F (37.1 °C) (Oral) 18 5' 5\" (1.651 m) 156 lb 8 oz (71 kg) SpO2 BMI OB Status Smoking Status 98% 26.04 kg/m2 Hysterectomy Former Smoker Vitals History BMI and BSA Data Body Mass Index Body Surface Area 26.04 kg/m 2 1.8 m 2 Preferred Pharmacy Pharmacy Name Phone [facility] [address], [address] [place] [address] [phone] Your Updated Medication List  
  
   
This list is accurate as of 5/15/18  2:44 PM.  Always use your most recent med list.  
  
  
  
  
 acetaminophen 500 mg tablet Commonly known as:  TYLENOL Take 500 mg by mouth every six (6) hours as needed for Pain. aspirin delayed-release 81 mg tablet Take 81 mg by mouth daily. AVAPRO 300 mg tablet Generic drug:  irbesartan Take 300 mg by mouth nightly. carbidopa-levodopa  mg per tablet Commonly known as:  SINEMET Take 2 tablets by mouth TID  Indications: IDIOPATHIC PARKINSONISM Dilantin 100 mg ER capsule Generic drug:  phenytoin ER  
TAKE 4 CAPSULES BY MOUTH ON EVEN DAYS AND 3 CAPSULES BY MOUTH ON ODD DAYS  Indications: COMPLEX-PARTIAL EPILEPSY  
  
 ferrous sulfate 325 mg (65 mg iron) tablet Take 325 mg by mouth daily. gabapentin 300 mg capsule Commonly known as:  NEURONTIN Take 300-600 mg by mouth as needed. lidocaine 5 % Commonly known as:  LIDODERM  
  
 metFORMIN 500 mg tablet Commonly known as:  GLUCOPHAGE Take  by mouth two (2) times daily (with meals). multivitamin with iron tablet Take 1 Tab by mouth daily. VITAMIN D2 PO Take 2,000 Units by mouth daily. ZOCOR 10 mg tablet Generic drug:  simvastatin Take  by mouth nightly. Prescriptions Sent to Pharmacy Refills DILANTIN 100 mg ER capsule 1 Sig: TAKE 4 CAPSULES BY MOUTH ON EVEN DAYS AND 3 CAPSULES BY MOUTH ON ODD DAYS  Indications: COMPLEX-PARTIAL EPILEPSY  Class: Normal  
 Pharmacy: [facility] [address], [address] [facility] Ph #: [phone]  
 carbidopa-levodopa (SINEMET)  mg per tablet 6 Sig: Take 2 tablets by mouth TID  Indications: IDIOPATHIC PARKINSONISM Class: Normal  
 Pharmacy: [facility] [address], [address] [place] [address] Ph #: [phone] Follow-up Instructions Return in about 6 months (around 11/15/2018). Patient Instructions PRESCRIPTION REFILL POLICY [facility] Statement to Patients April 1, 2014 In an effort to ensure the large volume of patient prescription refills is processed in the most efficient and expeditious manner, we are asking our patients to assist us by calling your Pharmacy for all prescription refills, this will include also your  Mail Order Pharmacy. The pharmacy will contact our office electronically to continue the refill process. Please do not wait until the last minute to call your pharmacy. We need at least 48 hours (2days) to fill prescriptions. We also encourage you to call your pharmacy before going to  your prescription to make sure it is ready. With regard to controlled substance prescription refill requests (narcotic refills) that need to be picked up at our office, we ask your cooperation by providing us with at least 72 hours (3days) notice that you will need a refill. We will not refill narcotic prescription refill requests after 4:00pm on any weekday, Monday through Thursday, or after 2:00pm on Fridays, or on the weekends. We encourage everyone to explore another way of getting your prescription refill request processed using Volpit, our patient web portal through our electronic medical record system. Volpit is an efficient and effective way to communicate your medication request directly to the office and  downloadable as an geovany on your smart phone .  Volpit also features a review functionality that allows you to view your medication list as well as leave messages for your physician. Are you ready to get connected? If so please review the attatched instructions or speak to any of our staff to get you set up right away! Thank you so much for your cooperation. Should you have any questions please contact our Practice Administrator. The Physicians and Staff,  [facility] Kellee Sunshine [address] What is a living will? A living will is a legal form you use to write down the kind of care you want at the end of your life. It is used by the health professionals who will treat you if you aren't able to decide for yourself. If you put your wishes in writing, your loved ones and others will know what kind of care you want. They won't need to guess. This can ease your mind and be helpful to others. A living will is not the same as an estate or property will. An estate will explains what you want to happen with your money and property after you die. Is a living will a legal document? A living will is a legal document. Each state has its own laws about living caceres. If you move to another state, make sure that your living will is legal in the state where you now live. Or you might use a universal form that has been approved by many states. This kind of form can sometimes be completed and stored online. Your electronic copy will then be available wherever you have a connection to the Internet. In most cases, doctors will respect your wishes even if you have a form from a different state. · You don't need an  to complete a living will. But legal advice can be helpful if your state's laws are unclear, your health history is complicated, or your family can't agree on what should be in your living will. · You can change your living will at any time. Some people find that their wishes about end-of-life care change as their health changes.  
· In addition to making a living will, think about completing a medical power of  form. This form lets you name the person you want to make end-of-life treatment decisions for you (your \"health care agent\") if you're not able to. Many hospitals and nursing homes will give you the forms you need to complete a living will and a medical power of . · Your living will is used only if you can't make or communicate decisions for yourself anymore. If you become able to make decisions again, you can accept or refuse any treatment, no matter what you wrote in your living will. · Your state may offer an online registry. This is a place where you can store your living will online so the doctors and nurses who need to treat you can find it right away. What should you think about when creating a living will? Talk about your end-of-life wishes with your family members and your doctor. Let them know what you want. That way the people making decisions for you won't be surprised by your choices. Think about these questions as you make your living will: · Do you know enough about life support methods that might be used? If not, talk to your doctor so you know what might be done if you can't breathe on your own, your heart stops, or you're unable to swallow. · What things would you still want to be able to do after you receive life-support methods? Would you want to be able to walk? To speak? To eat on your own? To live without the help of machines? · If you have a choice, where do you want to be cared for? In your home? At a hospital or nursing home? · Do you want certain Episcopal practices performed if you become very ill? · If you have a choice at the end of your life, where would you prefer to die? At home? In a hospital or nursing home? Somewhere else? · Would you prefer to be buried or cremated? · Do you want your organs to be donated after you die? What should you do with your living will?  
· Make sure that your family members and your health care agent have copies of your living will. · Give your doctor a copy of your living will to keep in your medical record. If you have more than one doctor, make sure that each one has a copy. · You may want to put a copy of your living will where it can be easily found. Where can you learn more? Go to http://elaine-sandra.info/. Enter K703 in the search box to learn more about \"Learning About Living Seth Chaudhari. \" Current as of: September 24, 2016 Content Version: 11.4 © 8612-6320 TSO3. Care instructions adapted under license by [employer] (which disclaims liability or warranty for this information). If you have questions about a medical condition or this instruction, always ask your healthcare professional. [address] any warranty or liability for your use of this information. Patient history reviewed and patient examined. Hopefully she has her medical issues behind her and is on a more complete road of recovery. Will renew the Parkinson medicine and Dilantin today. Revisit in 6 months. Good luck with the follow-up on the GI issue. Introducing [facility]! Sydni Maldonado introduces JustFamily patient portal. Now you can access parts of your medical record, email your doctor's office, and request medication refills online. 1. In your internet browser, go to [email or web address] 2. Click on the First Time User? Click Here link in the Sign In box. You will see the New Member Sign Up page. 3. Enter your JustFamily Access Code exactly as it appears below. You will not need to use this code after youve completed the sign-up process. If you do not sign up before the expiration date, you must request a new code. · JustFamily Access Code: [email or web address] Expires: 6/10/2018  2:32 PM 
 
4. Enter the last four digits of your Social Security Number (xxxx) and Date of Birth (mm/dd/yyyy) as indicated and click Submit.  You will be taken to the next sign-up page. 5. Create a ImpactGames ID. This will be your ImpactGames login ID and cannot be changed, so think of one that is secure and easy to remember. 6. Create a ImpactGames password. You can change your password at any time. 7. Enter your Password Reset Question and Answer. This can be used at a later time if you forget your password. 8. Enter your e-mail address. You will receive e-mail notification when new information is available in [address]. 9. Click Sign Up. You can now view and download portions of your medical record. 10. Click the Download Summary menu link to download a portable copy of your medical information. If you have questions, please visit the Frequently Asked Questions section of the ImpactGames website. Remember, ImpactGames is NOT to be used for urgent needs. For medical emergencies, dial 911. Now available from your iPhone and Android! Please provide this summary of care documentation to your next provider. Your primary care clinician is listed as Ho Mantilla. If you have any questions after today's visit, please call [phone].

## 2018-05-15 NOTE — PATIENT INSTRUCTIONS
[facility]   Statement to Patients  April 1, 2014      In an effort to ensure the large volume of patient prescription refills is processed in the most efficient and expeditious manner, we are asking our patients to assist us by calling your Pharmacy for all prescription refills, this will include also your  Mail Order Pharmacy. The pharmacy will contact our office electronically to continue the refill process. Please do not wait until the last minute to call your pharmacy. We need at least 48 hours (2days) to fill prescriptions. We also encourage you to call your pharmacy before going to  your prescription to make sure it is ready. With regard to controlled substance prescription refill requests (narcotic refills) that need to be picked up at our office, we ask your cooperation by providing us with at least 72 hours (3days) notice that you will need a refill. We will not refill narcotic prescription refill requests after 4:00pm on any weekday, Monday through Thursday, or after 2:00pm on Fridays, or on the weekends. We encourage everyone to explore another way of getting your prescription refill request processed using ApptheGame, our patient web portal through our electronic medical record system. ApptheGame is an efficient and effective way to communicate your medication request directly to the office and  downloadable as an geovany on your smart phone . ApptheGame also features a review functionality that allows you to view your medication list as well as leave messages for your physician. Are you ready to get connected? If so please review the attatched instructions or speak to any of our staff to get you set up right away! Thank you so much for your cooperation. Should you have any questions please contact our Practice Administrator.     The Physicians and Staff,  [address]  What is a living will?    A living will is a legal form you use to write down the kind of care you want at the end of your life. It is used by the health professionals who will treat you if you aren't able to decide for yourself. If you put your wishes in writing, your loved ones and others will know what kind of care you want. They won't need to guess. This can ease your mind and be helpful to others. A living will is not the same as an estate or property will. An estate will explains what you want to happen with your money and property after you die. Is a living will a legal document? A living will is a legal document. Each state has its own laws about living caceres. If you move to another state, make sure that your living will is legal in the state where you now live. Or you might use a universal form that has been approved by many states. This kind of form can sometimes be completed and stored online. Your electronic copy will then be available wherever you have a connection to the Internet. In most cases, doctors will respect your wishes even if you have a form from a different state. · You don't need an  to complete a living will. But legal advice can be helpful if your state's laws are unclear, your health history is complicated, or your family can't agree on what should be in your living will. · You can change your living will at any time. Some people find that their wishes about end-of-life care change as their health changes. · In addition to making a living will, think about completing a medical power of  form. This form lets you name the person you want to make end-of-life treatment decisions for you (your \"health care agent\") if you're not able to. Many hospitals and nursing homes will give you the forms you need to complete a living will and a medical power of . · Your living will is used only if you can't make or communicate decisions for yourself anymore.  If you become able to make decisions again, you can accept or refuse any treatment, no matter what you wrote in your living will. · Your state may offer an online registry. This is a place where you can store your living will online so the doctors and nurses who need to treat you can find it right away. What should you think about when creating a living will? Talk about your end-of-life wishes with your family members and your doctor. Let them know what you want. That way the people making decisions for you won't be surprised by your choices. Think about these questions as you make your living will:  · Do you know enough about life support methods that might be used? If not, talk to your doctor so you know what might be done if you can't breathe on your own, your heart stops, or you're unable to swallow. · What things would you still want to be able to do after you receive life-support methods? Would you want to be able to walk? To speak? To eat on your own? To live without the help of machines? · If you have a choice, where do you want to be cared for? In your home? At a hospital or nursing home? · Do you want certain Protestant practices performed if you become very ill? · If you have a choice at the end of your life, where would you prefer to die? At home? In a hospital or nursing home? Somewhere else? · Would you prefer to be buried or cremated? · Do you want your organs to be donated after you die? What should you do with your living will? · Make sure that your family members and your health care agent have copies of your living will. · Give your doctor a copy of your living will to keep in your medical record. If you have more than one doctor, make sure that each one has a copy. · You may want to put a copy of your living will where it can be easily found. Where can you learn more? Go to http://elaine-sandra.info/. Enter K982 in the search box to learn more about \"Learning About Living [place]. \"  Current as of: September 24, 2016  Content Version: 11.4  © 7815-2973 Healthwise[employer]. Care instructions adapted under license by [employer] (which disclaims liability or warranty for this information). If you have questions about a medical condition or this instruction, always ask your healthcare professional. [address]  any warranty or liability for your use of this information. Patient history reviewed and patient examined. Hopefully she has her medical issues behind her and is on a more complete road of recovery. Will renew the Parkinson medicine and Dilantin today. Revisit in 6 months. Good luck with the follow-up on the GI issue.

## 2018-05-15 NOTE — PROGRESS NOTES
Neurology Consult      Subjective: Lisset Chatman is a 81 y.o. 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 Is on branded Dilantin 400 mg on even days and 300 mg on odd days. Also acquired Parkinson's for which she is on Sinemet immediate release 25/100-at 2 3 times daily and uses a Rollator for excursions outside the home. Also takes gabapentin for most certainly a diabetic related neuropathy issue. Had left knee replacement up in [address] [place] [address]. Was discharged to a rehab facility but then became acutely sick with what was later described as acute bowel obstruction. Ended up in the hospital with affiliations to [facility] and was gradually nurse back. Apparently was quite sick and it was so critical no one knew if she would survive or not. The hospitalization is a complete blur to her. Part of the problem was she was being medicated for pain post total knee replacement. Has some unfinished business with her GI issues and needs a referral as I understand it to a specialist in this area that is forwarded from [facility]. Looks tired and feels like she has run a marathon both physically and mentally. Today both her Dilantin and Sinemet were renewed by request and will see her back in 6 months. Current Outpatient Prescriptions   Medication Sig Dispense Refill    gabapentin (NEURONTIN) 300 mg capsule Take 300-600 mg by mouth as needed.  DILANTIN 100 mg ER capsule TAKE 4 CAPSULES BY MOUTH ON EVEN DAYS AND 3 CAPSULES BY MOUTH ON ODD DAYS  Indications: COMPLEX-PARTIAL EPILEPSY 360 Cap 1    carbidopa-levodopa (SINEMET)  mg per tablet Take 2 tablets by mouth TID  Indications: IDIOPATHIC PARKINSONISM 180 Tab 6    ERGOCALCIFEROL, VITAMIN D2, (VITAMIN D2 PO) Take 2,000 Units by mouth daily.  ferrous sulfate 325 mg (65 mg iron) tablet Take 325 mg by mouth daily.       acetaminophen (TYLENOL) 500 mg tablet Take 500 mg by mouth every six (6) hours as needed for Pain.  aspirin delayed-release 81 mg tablet Take 81 mg by mouth daily.  lidocaine (LIDODERM) 5 %       irbesartan (AVAPRO) 300 mg tablet Take 300 mg by mouth nightly.  multivitamin with iron tablet Take 1 Tab by mouth daily.  metFORMIN (GLUCOPHAGE) 500 mg tablet Take  by mouth two (2) times daily (with meals).  simvastatin (ZOCOR) 10 mg tablet Take  by mouth nightly. Allergies   Allergen Reactions    Grape Anaphylaxis     Past Medical History:   Diagnosis Date    Anemia     Arthritis     DDD (degenerative disc disease)     Diabetes ([address]     Diabetes mellitus with neuropathy [facility]     Endogenous hyperlipemia     Hypertension     Parkinson disease ([facility]     Seizures ([facility]       Past Surgical History:   Procedure Laterality Date    HX KNEE REPLACEMENT Left 10/03/2017    HX OOPHORECTOMY        Social History     Social History    Marital status:      Spouse name: N/A    Number of children: N/A    Years of education: N/A     Occupational History    Not on file. Social History Main Topics    Smoking status: Former Smoker     Quit date: 10/23/1995    Smokeless tobacco: Current User     Types: Chew    Alcohol use Yes    Drug use: No    Sexual activity: Not on file     Other Topics Concern    Not on file     Social History Narrative      No family history on file. Visit Vitals    /70    Pulse 90    Temp 98.7 °F (37.1 °C) (Oral)    Resp 18    Ht 5' 5\" (1.651 m)    Wt 71 kg (156 lb 8 oz)    SpO2 98%    BMI 26.04 kg/m2        Review of Systems:   A comprehensive review of systems was negative except for that written in the HPI. Neuro Exam:     Appearance: The patient is well developed, well nourished, provides a coherent history and is in no acute distress. Mental Status: Oriented to time, place and person. Mood and affect appropriate. Cranial Nerves:   Intact visual fields. Fundi are benign.  MATTHIAS EOM's full, no nystagmus, no ptosis. Facial sensation is normal. Corneal reflexes are intact. Facial movement is symmetric. Hearing is normal bilaterally. Palate is midline with normal sternocleidomastoid and trapezius muscles are normal. Tongue is midline. Motor:  5/5 strength in upper and lower proximal and distal muscles. Normal bulk and tone. No fasciculations. Reflexes:   Deep tendon reflexes 2+/4 and symmetrical.   Sensory:   Normal to touch, pinprick and vibration. Gait:   Transfers and gait taken slow and cautious but functional without Rollator. Tremor:    Minor LUE tremor noted. Cerebellar:  No cerebellar signs present. Neurovascular:  Normal heart sounds and regular rhythm, peripheral pulses intact, and no carotid bruits. Assessment:   Problem 1 localization-related seizures. Continue Dilantin and refilled by request.  Seizure-free for years. Problem 2 idiopathic Parkinson's. Sinemet renewed by request.  Hopefully the left knee replacement and eventually right knee will improve her mobility status. Problem 3 neuropathy no doubt diabetic. Takes gabapentin as needed. Plan:   Revisit 6 months.   Signed by :  Wanda Ruiz MD

## 2018-05-23 ENCOUNTER — TELEPHONE (OUTPATIENT)
Dept: NEUROLOGY | Age: 83
End: 2018-05-23

## 2018-05-23 DIAGNOSIS — G40.209 PARTIAL EPILEPSY WITH IMPAIRMENT OF CONSCIOUSNESS (HCC): ICD-10-CM

## 2018-05-23 RX ORDER — PHENYTOIN SODIUM 100 MG/1
CAPSULE, EXTENDED RELEASE ORAL
Qty: 360 CAP | Refills: 1 | Status: SHIPPED | OUTPATIENT
Start: 2018-05-23 | End: 2018-11-14 | Stop reason: SDUPTHER

## 2018-05-23 NOTE — TELEPHONE ENCOUNTER
Patient reports that she has been taking generic Dilantin,phenytoin, \"for years\". New Rx sent to [facility].

## 2018-05-23 NOTE — TELEPHONE ENCOUNTER
Pharmacy called and said they received a medication for Dilantin that said brand name medical neccessary.  Patient said she doesn't need brand name and pharmacist wants to know if he can switch to generic due to cost.

## 2018-05-23 NOTE — TELEPHONE ENCOUNTER
----- Message from Kathie Parks sent at 5/23/2018  1:41 PM EDT -----  Regarding: Dr. David Gracia  Pt returned nurse call. Best contact number [phone].

## 2018-11-14 DIAGNOSIS — G40.209 PARTIAL EPILEPSY WITH IMPAIRMENT OF CONSCIOUSNESS (HCC): ICD-10-CM

## 2018-11-15 RX ORDER — PHENYTOIN SODIUM 100 MG/1
CAPSULE, EXTENDED RELEASE ORAL
Qty: 360 CAP | Refills: 0 | Status: SHIPPED | OUTPATIENT
Start: 2018-11-15 | End: 2018-12-12 | Stop reason: DRUGHIGH

## 2018-11-21 DIAGNOSIS — G40.209 PARTIAL EPILEPSY WITH IMPAIRMENT OF CONSCIOUSNESS (HCC): ICD-10-CM

## 2018-11-22 RX ORDER — PHENYTOIN SODIUM 100 MG/1
CAPSULE, EXTENDED RELEASE ORAL
Qty: 360 CAP | Refills: 0 | Status: SHIPPED | OUTPATIENT
Start: 2018-11-22 | End: 2018-12-12 | Stop reason: DRUGHIGH

## 2018-12-12 ENCOUNTER — OFFICE VISIT (OUTPATIENT)
Dept: NEUROLOGY | Age: 83
End: 2018-12-12

## 2018-12-12 VITALS — HEIGHT: 65 IN | OXYGEN SATURATION: 98 % | BODY MASS INDEX: 26.04 KG/M2 | RESPIRATION RATE: 16 BRPM | HEART RATE: 96 BPM

## 2018-12-12 DIAGNOSIS — G62.9 NEUROPATHY: ICD-10-CM

## 2018-12-12 DIAGNOSIS — R56.9 SEIZURE (HCC): ICD-10-CM

## 2018-12-12 DIAGNOSIS — G20 PARKINSON DISEASE (HCC): Primary | ICD-10-CM

## 2018-12-12 RX ORDER — PHENYTOIN SODIUM 100 MG/1
400 CAPSULE, EXTENDED RELEASE ORAL DAILY
COMMUNITY

## 2018-12-12 RX ORDER — MIRTAZAPINE 15 MG/1
TABLET, FILM COATED ORAL
COMMUNITY

## 2018-12-12 RX ORDER — GABAPENTIN 300 MG/1
300-600 CAPSULE ORAL AS NEEDED
Qty: 60 CAP | Refills: 6 | Status: SHIPPED | OUTPATIENT
Start: 2018-12-12

## 2018-12-12 NOTE — PROGRESS NOTES
Neurology Consult      Subjective: Angel Machado is a 81 y.o. female who came in today as a long-term seizure patient on brand Dilantin 400 mg versus 300 mg every other day. No seizure activity in many years. Will check an updated level out of principal.  Has had recent hematology check on her CBCs as she was slightly anemic. Is on Sinemet 25/100 taking to 3 times a day for Parkinson's. Has come in on a Rollator but today she made it with a wheelchair. Complains of some musculoskeletal discomfort in her upper and lower back but as I watched her during the encounter timeframe she has a tendency to consistently display stooped shoulder posturing. Warned her about the consequences of myofascial discomfort with that type of mechanical disjunction. I advised her to continue her exercise bike routine. Has neuropathy which I think simply defaults to her diabetes. Was on gabapentin at one point and told the nurse practitioner that she wanted off the medicine and so that was done. Has long range solution to her relative isolation here in [place]. His family members up in [address] [place] [address] and I think that makes perfect sense with her respective medical problems. Revisit 6 months. Current Outpatient Medications   Medication Sig Dispense Refill    phenytoin ER (DILANTIN EXTENDED) 100 mg ER capsule Take 400 mg by mouth daily.  carbidopa-levodopa (SINEMET)  mg per tablet Take 2 tablets by mouth TID  Indications: IDIOPATHIC PARKINSONISM 180 Tab 6    ERGOCALCIFEROL, VITAMIN D2, (VITAMIN D2 PO) Take 2,000 Units by mouth daily.  ferrous sulfate 325 mg (65 mg iron) tablet Take 325 mg by mouth daily.  aspirin delayed-release 81 mg tablet Take 81 mg by mouth daily.  lidocaine (LIDODERM) 5 %       irbesartan (AVAPRO) 300 mg tablet Take 300 mg by mouth nightly.  multivitamin with iron tablet Take 1 Tab by mouth daily.       metFORMIN (GLUCOPHAGE) 500 mg tablet Take  by mouth two (2) times daily (with meals).  simvastatin (ZOCOR) 10 mg tablet Take  by mouth nightly.  mirtazapine (REMERON) 15 mg tablet Take  by mouth nightly.  gabapentin (NEURONTIN) 300 mg capsule Take 300-600 mg by mouth as needed.  acetaminophen (TYLENOL) 500 mg tablet Take 500 mg by mouth every six (6) hours as needed for Pain. Allergies   Allergen Reactions    Grape Anaphylaxis     Past Medical History:   Diagnosis Date    Anemia     Arthritis     DDD (degenerative disc disease)     [facility]     Diabetes mellitus with neuropathy [facility]     Endogenous hyperlipemia     Hypertension     Parkinson disease ([facility]     Seizures ([facility]       Past Surgical History:   Procedure Laterality Date    HX KNEE REPLACEMENT Left 10/03/2017    HX OOPHORECTOMY        Social History     Socioeconomic History    Marital status:      Spouse name: Not on file    Number of children: Not on file    Years of education: Not on file    Highest education level: Not on file   Social Needs    Financial resource strain: Not on file    Food insecurity - worry: Not on file    Food insecurity - inability: Not on file   [employer] needs - medical: Not on file   [employer] needs - non-medical: Not on file   Occupational History    Not on file   Tobacco Use    Smoking status: Former Smoker     Last attempt to quit: 10/23/1995     Years since quittin.1    Smokeless tobacco: Current User     Types: Chew   Substance and Sexual Activity    Alcohol use: Yes    Drug use: No    Sexual activity: Not on file   Other Topics Concern    Not on file   Social History Narrative    Not on file      No family history on file. Visit Vitals  Pulse 96   Resp 16   Ht 5' 5\" (1.651 m)   SpO2 98%   BMI 26.04 kg/m²        Review of Systems:   A comprehensive review of systems was negative except for that written in the HPI. Neuro Exam:     Appearance:   The patient is well developed, well nourished, provides a coherent history and is in no acute distress. Mental Status: Oriented to time, place and person. Mood and affect appropriate. Cranial Nerves:   Intact visual fields. Fundi are benign. MATTHIAS, EOM's full, no nystagmus, no ptosis. Facial sensation is normal. Corneal reflexes are intact. Facial movement is symmetric. Hearing is normal bilaterally. Palate is midline with normal sternocleidomastoid and trapezius muscles are normal. Tongue is midline. Motor:  5/5 strength in upper and lower proximal and distal muscles. Normal bulk and tone. No fasciculations. Reflexes:   Deep tendon reflexes 0-1+/4 and symmetrical.   Sensory:    Diminished distally to touch, pinprick and vibration. Gait:   Came in a wheelchair. Tremor:   No tremor noted. Cerebellar:  No cerebellar signs present. Neurovascular:  Normal heart sounds and regular rhythm, peripheral pulses intact, and no carotid bruits. Manifest bradykinesia minimal rigidity and subtle rest tremor. Assessment:   Problem 1 Parkinson's disease. Continue Sinemet as is and continue the exercise bike. Was warned about her posture as she tends to maintain her stooped shoulder posturing with secondary effects on her upper and lower back performance not to mention pain. Currently uses wheelchair for excursions versus Rollator. I think her plan to move to the [place] area with her family is an excellent secure strategy. Problem 2 seizures. These are no doubt localization-related and is on branded Dilantin. 400 mg versus 300 mg every other day. We will check updated Dilantin level. No seizures for many years. Problem 3 neuropathy. This probably defaults to diabetes. Patient asked to come off the gabapentin through the nurse practitioner and I can reinstate if interested.       Plan:     Signed by :  Vicente Gregg MD

## 2018-12-12 NOTE — PATIENT INSTRUCTIONS
Patient history reviewed and patient examined. We will check updated Dilantin level out of principal.  Can reinstitute gabapentin but on the last visit with the neuro practitioner patient requested coming off that medicine. Continue Sinemet and stay as reasonably and safely busy as possible. Was reminded to check her posture as she has a tendency to sit and stand stooped shouldered. Continue with the exercise bike.

## 2018-12-14 LAB — PHENYTOIN SERPL-MCNC: 6 UG/ML (ref 10–20)

## 2019-01-11 ENCOUNTER — TELEPHONE (OUTPATIENT)
Dept: NEUROLOGY | Age: 84
End: 2019-01-11

## 2019-01-11 NOTE — TELEPHONE ENCOUNTER
Patient is working with [facility] and wanted to inform Dr. Debi Chaparro that they would be asking for medical info. Explained to the patient that we need written permission to talk to anyone.   Good understanding verbalized

## 2019-01-11 NOTE — TELEPHONE ENCOUNTER
Patient stated that she wants to talk with the nurse. She stated it was to much to type but stated that it was very important .  She stated that she wanted to talk with Dr. Nghia Mendieta

## 2019-03-31 DIAGNOSIS — G20 PARKINSON'S DISEASE (HCC): ICD-10-CM

## 2019-04-01 RX ORDER — CARBIDOPA AND LEVODOPA 25; 100 MG/1; MG/1
TABLET ORAL
Qty: 180 TAB | Refills: 0 | Status: SHIPPED | OUTPATIENT
Start: 2019-04-01 | End: 2019-05-09 | Stop reason: SDUPTHER

## 2019-04-19 ENCOUNTER — OFFICE VISIT (OUTPATIENT)
Dept: NEUROLOGY | Age: 84
End: 2019-04-19

## 2019-04-19 VITALS
BODY MASS INDEX: 25.91 KG/M2 | HEIGHT: 65 IN | DIASTOLIC BLOOD PRESSURE: 70 MMHG | HEART RATE: 108 BPM | WEIGHT: 155.5 LBS | OXYGEN SATURATION: 98 % | RESPIRATION RATE: 18 BRPM | SYSTOLIC BLOOD PRESSURE: 144 MMHG

## 2019-04-19 DIAGNOSIS — G40.009 LOCALIZATION-RELATED (FOCAL) (PARTIAL) IDIOPATHIC EPILEPSY AND EPILEPTIC SYNDROMES WITH SEIZURES OF LOCALIZED ONSET, NOT INTRACTABLE, WITHOUT STATUS EPILEPTICUS (HCC): Primary | ICD-10-CM

## 2019-04-19 DIAGNOSIS — G20 PARKINSON DISEASE (HCC): ICD-10-CM

## 2019-04-19 NOTE — PROGRESS NOTES
Neurology Consult      Subjective: Onofre Willis is a 81 y.o. 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 Also on Sinemet 25/100 immediate release 3 times daily dosing and is done well. Uses good commonsense to stay out of trouble and she has had no recent falls. Uses a Rollator which she brought in. Also has diabetic neuropathy which makes things interesting in terms of transfers and confidence in balance and center of gravity. Will be moving shortly to the [place] area is understands to be with family. She should do very well and I think that is a very smart move on her part to be with her family. Is interesting I have seen her since the mid 1990s and is always been a pleasure to work with her she is motivated to help her self and minimize any trouble that she can on her part by being compliant etc. obviously this is the last visit as she is going to be moving up there in less than 30 days. Good luck. Current Outpatient Medications   Medication Sig Dispense Refill    carbidopa-levodopa (SINEMET)  mg per tablet TAKE 2 TABLETS BY MOUTH THREE TIMES DAILY 180 Tab 0    phenytoin ER (DILANTIN EXTENDED) 100 mg ER capsule Take 400 mg by mouth daily.  gabapentin (NEURONTIN) 300 mg capsule Take 1-2 Caps by mouth as needed. 60 Cap 6    ERGOCALCIFEROL, VITAMIN D2, (VITAMIN D2 PO) Take 2,000 Units by mouth daily.  ferrous sulfate 325 mg (65 mg iron) tablet Take 325 mg by mouth daily.  aspirin delayed-release 81 mg tablet Take 81 mg by mouth daily.  lidocaine (LIDODERM) 5 %       multivitamin with iron tablet Take 1 Tab by mouth daily.  metFORMIN (GLUCOPHAGE) 500 mg tablet Take  by mouth two (2) times daily (with meals).  simvastatin (ZOCOR) 10 mg tablet Take  by mouth nightly.       phenytoin ER (DILANTIN ER) 100 mg ER capsule TAKE 4 CAPSULES BY MOUTH ON EVEN DAYS AND 3 CAPSULES ON ODD DAYS (Patient taking differently: Take 400 mg by mouth daily. TAKE 4 CAPSULES BY MOUTH ON EVEN DAYS AND 3 CAPSULES ON ODD DAYS) 360 Cap 1    mirtazapine (REMERON) 15 mg tablet Take  by mouth nightly.  acetaminophen (TYLENOL) 500 mg tablet Take 500 mg by mouth every six (6) hours as needed for Pain.  irbesartan (AVAPRO) 300 mg tablet Take 300 mg by mouth nightly. Allergies   Allergen Reactions    Grape Anaphylaxis     Past Medical History:   Diagnosis Date    Anemia     Arthritis     DDD (degenerative disc disease)     [facility]     Diabetes mellitus with neuropathy ([facility]     Endogenous hyperlipemia     Hypertension     Parkinson disease ([facility]     Seizures ([facility]       Past Surgical History:   Procedure Laterality Date    HX KNEE REPLACEMENT Left 10/03/2017    HX OOPHORECTOMY        Social History     Socioeconomic History    Marital status:      Spouse name: Not on file    Number of children: Not on file    Years of education: Not on file    Highest education level: Not on file   Occupational History    Not on file   Social Needs    Financial resource strain: Not on file    Food insecurity:     Worry: Not on file     Inability: Not on file    Transportation needs:     Medical: Not on file     Non-medical: Not on file   Tobacco Use    Smoking status: Former Smoker     Last attempt to quit: 10/23/1995     Years since quittin.5    Smokeless tobacco: Current User     Types: Chew   Substance and Sexual Activity    Alcohol use:  Yes    Drug use: No    Sexual activity: Not on file   Lifestyle    Physical activity:     Days per week: Not on file     Minutes per session: Not on file    Stress: Not on file   Relationships    Social connections:     Talks on phone: Not on file     Gets together: Not on file     Attends Orthodox service: Not on file     Active member of club or organization: Not on file     Attends meetings of clubs or organizations: Not on file     Relationship status: Not on file    Intimate partner violence:     Fear of current or ex partner: Not on file     Emotionally abused: Not on file     Physically abused: Not on file     Forced sexual activity: Not on file   Other Topics Concern    Not on file   Social History Narrative    Not on file      No family history on file. Visit Vitals  /70   Pulse (!) 108   Resp 18   Ht 5' 5\" (1.651 m)   Wt 70.5 kg (155 lb 8 oz)   SpO2 98%   BMI 25.88 kg/m²        Review of Systems:   A comprehensive review of systems was negative except for that written in the HPI. Neuro Exam:     Appearance: The patient is well developed, well nourished, provides a coherent history and is in no acute distress. Mental Status: Oriented to time, place and person. Mood and affect appropriate. Cranial Nerves:   Intact visual fields. Fundi are benign. MATTHIAS, EOM's full, no nystagmus, no ptosis. Facial sensation is normal. Corneal reflexes are intact. Facial movement is symmetric. Hearing is normal bilaterally. Palate is midline with normal sternocleidomastoid and trapezius muscles are normal. Tongue is midline. Motor:  5/5 strength in upper and lower proximal and distal muscles. Normal bulk and tone. No fasciculations. Reflexes:   Deep tendon reflexes 0-trace/4 and symmetrical.   Sensory:    Diminished distally to touch, pinprick and vibration. Gait:   Came with a Rollator and moves slowly and cautiously but functionally. Does have manifests bradykinesia and an element of rigidity. Tremor:   No tremor noted. Cerebellar:  No cerebellar signs present. Neurovascular:  Normal heart sounds and regular rhythm, peripheral pulses intact, and no carotid bruits. Assessment:   Problem 1 seizures no doubt localization-related not status epilepticus not intractable.   Continue on the Dilantin 400 mg alternating with 300 mg every other day.    Problem 2 Parkinson's disease. Continue on the Sinemet 3 times a day dosing as is and that could change with time. Be cautious. Suggestion that her bathroom be made user-friendly and safe with grab bars and other items to prevent falls and other mishaps. Problem 3 diabetic neuropathy. Continue best diabetic control and this is a respectable issue when he comes to balance and center of gravity skills. Plan:   Revisit as needed.   Signed by :  Abel Bourgeois MD

## 2019-04-19 NOTE — LETTER
4/19/19 Patient: Sixto Goss YOB: 1935 Date of Visit: 4/19/2019 Sam Cardenas MD 
[address] [place] [address] [address] [address] VIA Facsimile: [phone] Dear Sam Cardenas MD, Thank you for referring Ms. Sixto Goss to [facility] for evaluation. My notes for this consultation are attached. If you have questions, please do not hesitate to call me. I look forward to following your patient along with you.  
 
 
Sincerely, 
 
Neisha Cano MD

## 2021-03-23 ENCOUNTER — TELEPHONE (OUTPATIENT)
Dept: NEUROLOGY | Age: 86
End: 2021-03-23

## 2021-03-23 NOTE — TELEPHONE ENCOUNTER
----- Message from [facility] sent at 3/22/2021  3:59 PM EDT -----  Regarding: /Telephone     Caller's first and last name:Argenis Noble (daughter)  Reason for call:Parkinsons diagnosis  Callback required yes/no and why:Yes  Best contact number(s):[phone]  Details to clarify the request: General Manuel wants to know the exact date her mother was diagnosed with parkinsons disease.

## 2021-03-23 NOTE — TELEPHONE ENCOUNTER
First progress note mentioning possible PD was 1/29/16 at which time Dr. Colette Cruz introduced Sinemet. Pt's daughter notified.

## 2024-07-19 NOTE — PROGRESS NOTES
Chief Complaint   Patient presents with    Dysuria    Pvr 52ml done with bladder scan   Pain in lower abd and also groin too  Jung Jackman, CMA     Date:  17     Name:  Chetna Viveros  :  1935  MRN:  [id number]     PCP:  Ania Fitzgerald MD    Chief Complaint   Patient presents with    Seizure     last known     Tremors     HISTORY OF PRESENT ILLNESS: Follow up for Parkinson Disease and epilepsy. Pt has not had a seizure since last visit, no seizures since the . Pt states that her PD symptoms are stable as well, she has a slight tremor which is not bothersome. She has some rigidity but states that she has more issues with her knees, hips and back related arthritic issues. She takes tramadol twice daily for this pain. She also uses Volatren gel but states that it doesn't help much. She denies dysphagia, depression, anxiety, memory loss, wearing off, nausea, dizziness, constipation, hallucinations. States that she doesn't sleep much but doesn't want anything for this. She is complaining of sciatic pain in BLE. She continues to perform all ADLs without issue. She believes she has some neuropathy in her feet but it is not painful. She feels safe driving and cooking. The things that are bothering her the most are her knees, she was told she needed surgery but then told that she shouldn't have it by her gerontologist.       Current Outpatient Prescriptions   Medication Sig    lidocaine (LIDODERM) 5 %     LACTOSE-REDUCED FOOD (ENSURE PO) Take  by mouth.  carbidopa-levodopa (SINEMET)  mg per tablet Take 1.5 Tabs by mouth three (3) times daily.  ERGOCALCIFEROL, VITAMIN D2, (VITAMIN D2 PO) Take  by mouth.  DILANTIN 100 mg ER capsule TAKE 4 CAPSULES BY MOUTH ON EVEN DAYS AND 3 CAPSULES BY MOUTH ON ODD DAYS    traMADol-acetaminophen (ULTRACET) 37.5-325 mg per tablet Take one by mouth as needed for pain    ferrous sulfate, dried (IRON) 160 mg (50 mg iron) TbER Take  by mouth.  irbesartan (AVAPRO) 300 mg tablet Take 300 mg by mouth nightly.     nystatin-triamcinolone (MYCOLOG) 100,000-0.1 unit/gram-% ointment Apply  to affected area two (2) times a day.  multivitamin with iron tablet Take 1 Tab by mouth daily.  metFORMIN (GLUCOPHAGE) 500 mg tablet Take  by mouth two (2) times daily (with meals).  simvastatin (ZOCOR) 10 mg tablet Take  by mouth nightly. No current facility-administered medications for this visit. No Known Allergies  Past Medical History:   Diagnosis Date    Anemia     Arthritis     DDD (degenerative disc disease)     [facility]     [facility]     Endogenous hyperlipemia     Hypertension     Parkinson disease ([facility]     Seizures [facility]      Past Surgical History:   Procedure Laterality Date    HX OOPHORECTOMY       Social History     Social History    Marital status:      Spouse name: N/A    Number of children: N/A    Years of education: N/A     Occupational History    Not on file. Social History Main Topics    Smoking status: Former Smoker     Quit date: 10/23/1995    Smokeless tobacco: Current User     Types: Chew    Alcohol use Yes    Drug use: No    Sexual activity: Not on file     Other Topics Concern    Not on file     Social History Narrative     History reviewed. No pertinent family history. PHYSICAL EXAMINATION:    Visit Vitals    /62    Resp 20    Ht 5' 5\" (1.651 m)    Wt 73.5 kg (162 lb)    BMI 26.96 kg/m2     General: Well defined, nourished, and groomed individual in no acute distress. Neck: Supple, nontender, no bruits, no pain with resistance to active range of motion. Heart: Regular rate and rhythm, no murmurs, rub, or gallop. Normal S1S2. Lungs: Clear to auscultation bilaterally with equal chest expansion, no cough, no wheeze  Musculoskeletal: . Limited range of motion win LLE, swelling to L knee. Non-pitting edema to LLE. Psych: Good mood and bright affect      NEUROLOGICAL EXAMINATION:   Mental Status: Alert and oriented to person, place, and time with recent and remote memory intact. Attention span and concentration are normal. Speech is fluent with a full fund of knowledge.       Cranial Nerves:   II, III, IV, VI: Visual acuity grossly intact. Visual fields are normal.   Pupils are equal, round, and reactive to light and accommodation. Extra-ocular movements are full and fluid. Fundoscopic exam was benign, no ptosis or nystagmus. V-XII: Hearing is grossly intact. Facial features are symmetric, with normal sensation and strength. The palate rises symmetrically and the tongue protrudes midline. Sternocleidomastoids 5/5.       Motor Examination: Normal tone, bulk, and strength, 5/5 muscle strength throughout. Mild bilateral cogwheel rigidity, left greater than right.       Coordination: Finger to nose and rapid arm movement testing revealed bradykinesia. Left resting or intention tremor      Gait and Station: Scissor gait, shuffled, decreased bilateral arm swing, L>R, left attenuation of the tremor, 7 step en bloc turn. No pronator drift. No muscle wasting or fasiculations noted.      Sensory: Minor sensory loss to bilateral toes. Temperature and vibratory sensation intact. Reflexes: DTRs 2+ throughout. ASSESSMENT AND PLAN    ICD-10-CM ICD-9-CM    1. Parkinson's disease ([facility] G20 332.0 carbidopa-levodopa (SINEMET)  mg per tablet   2. Partial epilepsy with impairment of consciousness (HCC) G40.209 345.40 DILANTIN 100 mg ER capsule   3. Primary osteoarthritis of both knees M17.0 715.16    4. Sciatica of right side M54.31 724.3 gabapentin (NEURONTIN) 100 mg capsule   5. Idiopathic peripheral neuropathy G60.9 356.9 gabapentin (NEURONTIN) 100 mg capsule     Pt is stable on her epilepsy and PD medications and does not desire any changes at this time. Pt states that she has had SI joint injections in the past which were not helpful. She also states that she does not like needles and does not want any injections at this time.   We also discussed that PT can help with sciatic pain but she is not interested. She says that she will see another orthopedist instead. Pt does want something for her neuropathy. We will prescribe gabapentin today, educated on side effect including fatigue and dizziness. She can follow up in 3 months. Informed that she can call with issues or concerns at any time. Rosa Palma

## 2025-03-01 NOTE — PATIENT INSTRUCTIONS
[facility]   Statement to Patients  April 1, 2014      In an effort to ensure the large volume of patient prescription refills is processed in the most efficient and expeditious manner, we are asking our patients to assist us by calling your Pharmacy for all prescription refills, this will include also your  Mail Order Pharmacy. The pharmacy will contact our office electronically to continue the refill process. Please do not wait until the last minute to call your pharmacy. We need at least 48 hours (2days) to fill prescriptions. We also encourage you to call your pharmacy before going to  your prescription to make sure it is ready. With regard to controlled substance prescription refill requests (narcotic refills) that need to be picked up at our office, we ask your cooperation by providing us with at least 72 hours (3days) notice that you will need a refill. We will not refill narcotic prescription refill requests after 4:00pm on any weekday, Monday through Thursday, or after 2:00pm on Fridays, or on the weekends. We encourage everyone to explore another way of getting your prescription refill request processed using [email or web address], our patient web portal through our electronic medical record system. [email or web address] is an efficient and effective way to communicate your medication request directly to the office and  downloadable as an geovany on your smart phone . [email or web address] also features a review functionality that allows you to view your medication list as well as leave messages for your physician. Are you ready to get connected? If so please review the attatched instructions or speak to any of our staff to get you set up right away! Thank you so much for your cooperation. Should you have any questions please contact our Practice Administrator.     The Physicians and Staff,  [facility] [address]  What is a living Emergent right thoracentesis done by ERP. Patient in agreement with procedure. Verbal consent given.    will?    A living will is a legal form you use to write down the kind of care you want at the end of your life. It is used by the health professionals who will treat you if you aren't able to decide for yourself. If you put your wishes in writing, your loved ones and others will know what kind of care you want. They won't need to guess. This can ease your mind and be helpful to others. A living will is not the same as an estate or property will. An estate will explains what you want to happen with your money and property after you die. Is a living will a legal document? A living will is a legal document. Each state has its own laws about living caceres. If you move to another state, make sure that your living will is legal in the state where you now live. Or you might use a universal form that has been approved by many states. This kind of form can sometimes be completed and stored online. Your electronic copy will then be available wherever you have a connection to the Internet. In most cases, doctors will respect your wishes even if you have a form from a different state. · You don't need an  to complete a living will. But legal advice can be helpful if your state's laws are unclear, your health history is complicated, or your family can't agree on what should be in your living will. · You can change your living will at any time. Some people find that their wishes about end-of-life care change as their health changes. · In addition to making a living will, think about completing a medical power of  form. This form lets you name the person you want to make end-of-life treatment decisions for you (your \"health care agent\") if you're not able to. Many hospitals and nursing homes will give you the forms you need to complete a living will and a medical power of . · Your living will is used only if you can't make or communicate decisions for yourself anymore.  If you become able to make decisions again, you can accept or refuse any treatment, no matter what you wrote in your living will. · Your state may offer an online registry. This is a place where you can store your living will online so the doctors and nurses who need to treat you can find it right away. What should you think about when creating a living will? Talk about your end-of-life wishes with your family members and your doctor. Let them know what you want. That way the people making decisions for you won't be surprised by your choices. Think about these questions as you make your living will:  · Do you know enough about life support methods that might be used? If not, talk to your doctor so you know what might be done if you can't breathe on your own, your heart stops, or you're unable to swallow. · What things would you still want to be able to do after you receive life-support methods? Would you want to be able to walk? To speak? To eat on your own? To live without the help of machines? · If you have a choice, where do you want to be cared for? In your home? At a hospital or nursing home? · Do you want certain Confucianist practices performed if you become very ill? · If you have a choice at the end of your life, where would you prefer to die? At home? In a hospital or nursing home? Somewhere else? · Would you prefer to be buried or cremated? · Do you want your organs to be donated after you die? What should you do with your living will? · Make sure that your family members and your health care agent have copies of your living will. · Give your doctor a copy of your living will to keep in your medical record. If you have more than one doctor, make sure that each one has a copy. · You may want to put a copy of your living will where it can be easily found. Where can you learn more? Go to http://eliane-sandra.info/. Enter D663 in the search box to learn more about \"Learning About Living [place]. \"  Current as of: April 18, 2018  Content Version: 11.9  © 6914-3410 [employer]. Care instructions adapted under license by [employer] (which disclaims liability or warranty for this information). If you have questions about a medical condition or this instruction, always ask your healthcare professional. [address] any warranty or liability for your use of this information. Good luck with the move and will be in the midst of good family and I am confident you will do well. Is been a pleasure working with you through the years and I am sure you will pickup a very fine physician [place].